# Patient Record
Sex: MALE | Race: WHITE | NOT HISPANIC OR LATINO | URBAN - METROPOLITAN AREA
[De-identification: names, ages, dates, MRNs, and addresses within clinical notes are randomized per-mention and may not be internally consistent; named-entity substitution may affect disease eponyms.]

---

## 2018-07-11 ENCOUNTER — OFFICE VISIT (OUTPATIENT)
Dept: INTERNAL MEDICINE | Facility: CLINIC | Age: 23
End: 2018-07-11

## 2018-07-11 VITALS
OXYGEN SATURATION: 100 % | BODY MASS INDEX: 39.71 KG/M2 | RESPIRATION RATE: 18 BRPM | DIASTOLIC BLOOD PRESSURE: 70 MMHG | HEIGHT: 67 IN | SYSTOLIC BLOOD PRESSURE: 120 MMHG | WEIGHT: 253 LBS

## 2018-07-11 DIAGNOSIS — K21.9 GASTROESOPHAGEAL REFLUX DISEASE, ESOPHAGITIS PRESENCE NOT SPECIFIED: Primary | ICD-10-CM

## 2018-07-11 DIAGNOSIS — M54.50 CHRONIC BILATERAL LOW BACK PAIN WITHOUT SCIATICA: ICD-10-CM

## 2018-07-11 DIAGNOSIS — G89.29 CHRONIC BILATERAL LOW BACK PAIN WITHOUT SCIATICA: ICD-10-CM

## 2018-07-11 PROCEDURE — 99203 OFFICE O/P NEW LOW 30 MIN: CPT | Performed by: FAMILY MEDICINE

## 2018-07-11 RX ORDER — OMEPRAZOLE 20 MG/1
20 TABLET, DELAYED RELEASE ORAL DAILY
Qty: 30 TABLET | Refills: 0 | Status: SHIPPED | OUTPATIENT
Start: 2018-07-11 | End: 2018-08-08
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
## 2018-07-11 NOTE — PATIENT INSTRUCTIONS
Thank you for coming in today.  I have ordered a referral for an upper scope procedure for you.  You will be called to set up an appointment with this specialist.  I have also prescribed Omeprazole (Prilosec) for you.  Your new medication has been electronically prescribed and will be at your pharmacy.  Please pick this up and take as directed.  May increase dose to 40 mg if 20 mg is not effective.  To help manage your heartburn/reflux symptoms, recommend that you avoid caffeine, alcohol, tobacco and spicy/greasy foods.  Sleep with the head of the bed slightly elevated to decrease reflux symptoms at night.  Avoid eating 3-4 hours prior to bedtime.   Please follow-up as indicated.  Return to the clinic sooner if your symptoms worsen or if you have any other concerns.

## 2018-08-08 ENCOUNTER — OFFICE VISIT (OUTPATIENT)
Dept: INTERNAL MEDICINE | Facility: CLINIC | Age: 23
End: 2018-08-08

## 2018-08-08 VITALS
WEIGHT: 255 LBS | DIASTOLIC BLOOD PRESSURE: 72 MMHG | RESPIRATION RATE: 18 BRPM | BODY MASS INDEX: 40.02 KG/M2 | HEIGHT: 67 IN | SYSTOLIC BLOOD PRESSURE: 106 MMHG

## 2018-08-08 DIAGNOSIS — K21.9 GASTROESOPHAGEAL REFLUX DISEASE, ESOPHAGITIS PRESENCE NOT SPECIFIED: Primary | ICD-10-CM

## 2018-08-08 PROCEDURE — 99213 OFFICE O/P EST LOW 20 MIN: CPT | Performed by: FAMILY MEDICINE

## 2018-08-08 RX ORDER — ESOMEPRAZOLE MAGNESIUM 40 MG/1
40 CAPSULE, DELAYED RELEASE ORAL
Qty: 30 CAPSULE | Refills: 0 | Status: SHIPPED | OUTPATIENT
Start: 2018-08-08 | End: 2019-01-11

## 2018-08-08 NOTE — PROGRESS NOTES
"Subjective   Luh Concepcion is a 23 y.o. male who presents to follow-up for Heartburn      History of Present Illness   Patient presents to follow-up for chronic heartburn management.  Was prescribed Prilosec after his last office visit and has been taking his medication as directed.  Denies any intolerable medication side effects.  States that he increased his dose to 40 mg after taking his medication for one week.  Has been having to take Tums twice a day a few days out of the week for breakthrough symptoms.  Denies associated nausea, vomiting, diarrhea, melena, hematochezia, abdominal pain.  Has been trying to monitor and avoid possible food triggers.  Admits that he was drinking alcohol recently while on vacation.    Review of Systems   Constitutional: Negative for appetite change, chills, fever and unexpected weight change.   Respiratory: Negative for cough, shortness of breath and wheezing.    Cardiovascular: Negative for chest pain and palpitations.   Gastrointestinal: Negative for abdominal pain, anal bleeding, blood in stool, constipation, diarrhea, nausea and vomiting.        Positive for heartburn.   Neurological: Negative for dizziness, syncope and headaches.     The following portions of the patient's history were reviewed and updated as appropriate: current medications, past medical history and problem list.    Objective   /72   Resp 18   Ht 170.2 cm (67\")   Wt 116 kg (255 lb)   BMI 39.94 kg/m²      Physical Exam   Constitutional: He is oriented to person, place, and time. He appears well-developed and well-nourished.   No acute distress.   HENT:   Head: Normocephalic and atraumatic.   Eyes: Conjunctivae and EOM are normal.   Neck: Normal range of motion.   Cardiovascular: Normal rate, regular rhythm, normal heart sounds and intact distal pulses.    Pulmonary/Chest: Effort normal and breath sounds normal. He has no wheezes.   Abdominal: Soft. Bowel sounds are normal. There is no tenderness. "   Musculoskeletal: Normal range of motion.   Moves all extremities.   Neurological: He is alert and oriented to person, place, and time.   Skin: Skin is warm and dry.   Psychiatric: He has a normal mood and affect. His behavior is normal.   Vitals reviewed.      Assessment/Plan   Luh was seen today for heartburn.    Diagnoses and all orders for this visit:    Gastroesophageal reflux disease, esophagitis presence not specified  -     esomeprazole (NEXIUM) 40 MG capsule; Take 1 capsule by mouth Every Morning Before Breakfast.    Will stop Prilosec and prescribe Nexium today.  Recommended that he continue to avoid possible food triggers.  Patient was referred to gastroenterology after his last office visit.  Will defer further management to his specialist.

## 2018-08-08 NOTE — PATIENT INSTRUCTIONS
Please stop taking your Prilosec and start taking Nexium.  Your new medication has been electronically prescribed and will be at your pharmacy.  Please pick this up and take as directed.  To help manage your heartburn/reflux symptoms, recommend that you avoid caffeine, alcohol, tobacco and spicy/greasy foods.  Sleep with the head of the bed slightly elevated to decrease reflux symptoms at night.  Avoid eating 3-4 hours prior to bedtime.   Please contact the GI specialist to schedule your appointment.  Please follow-up as indicated.  Return to the clinic sooner if you have any other concerns.

## 2018-08-20 ENCOUNTER — TELEPHONE (OUTPATIENT)
Dept: GASTROENTEROLOGY | Facility: CLINIC | Age: 23
End: 2018-08-20

## 2018-08-20 NOTE — TELEPHONE ENCOUNTER
Spoke with Sabi DAVIES @ [facility]    Authorization # [id number]    EGD approved from 08/20/2018-10/19/2018 if patient needs to reschedule for any reason.

## 2019-01-08 ENCOUNTER — OUTSIDE FACILITY SERVICE (OUTPATIENT)
Dept: GASTROENTEROLOGY | Facility: CLINIC | Age: 24
End: 2019-01-08

## 2019-01-08 ENCOUNTER — LAB REQUISITION (OUTPATIENT)
Dept: LAB | Facility: HOSPITAL | Age: 24
End: 2019-01-08

## 2019-01-08 DIAGNOSIS — K30 FUNCTIONAL DYSPEPSIA: ICD-10-CM

## 2019-01-08 DIAGNOSIS — K21.9 GASTRO-ESOPHAGEAL REFLUX DISEASE WITHOUT ESOPHAGITIS: ICD-10-CM

## 2019-01-08 DIAGNOSIS — R12 HEARTBURN: ICD-10-CM

## 2019-01-08 PROCEDURE — 88305 TISSUE EXAM BY PATHOLOGIST: CPT | Performed by: INTERNAL MEDICINE

## 2019-01-08 PROCEDURE — 43239 EGD BIOPSY SINGLE/MULTIPLE: CPT | Performed by: INTERNAL MEDICINE

## 2019-01-08 PROCEDURE — 88342 IMHCHEM/IMCYTCHM 1ST ANTB: CPT | Performed by: INTERNAL MEDICINE

## 2019-01-10 LAB
CYTO UR: NORMAL
LAB AP CASE REPORT: NORMAL
LAB AP CLINICAL INFORMATION: NORMAL
PATH REPORT.FINAL DX SPEC: NORMAL
PATH REPORT.GROSS SPEC: NORMAL

## 2019-01-11 ENCOUNTER — TELEPHONE (OUTPATIENT)
Dept: GASTROENTEROLOGY | Facility: CLINIC | Age: 24
End: 2019-01-11

## 2019-01-11 DIAGNOSIS — K29.70 HELICOBACTER PYLORI GASTRITIS: Primary | ICD-10-CM

## 2019-01-11 DIAGNOSIS — B96.81 HELICOBACTER PYLORI GASTRITIS: Primary | ICD-10-CM

## 2019-01-11 DIAGNOSIS — K21.9 GASTROESOPHAGEAL REFLUX DISEASE, ESOPHAGITIS PRESENCE NOT SPECIFIED: ICD-10-CM

## 2019-01-11 RX ORDER — ESOMEPRAZOLE MAGNESIUM 40 MG/1
40 CAPSULE, DELAYED RELEASE ORAL DAILY
Qty: 30 CAPSULE | Refills: 5 | Status: SHIPPED | OUTPATIENT
Start: 2019-01-11 | End: 2019-11-01

## 2019-01-11 RX ORDER — AMOXICILLIN 500 MG/1
1000 CAPSULE ORAL 2 TIMES DAILY
Qty: 56 CAPSULE | Refills: 0 | Status: SHIPPED | OUTPATIENT
Start: 2019-01-11 | End: 2019-11-04

## 2019-01-11 RX ORDER — CLARITHROMYCIN 500 MG/1
500 TABLET, COATED ORAL 2 TIMES DAILY
Qty: 28 TABLET | Refills: 0 | Status: SHIPPED | OUTPATIENT
Start: 2019-01-11 | End: 2019-11-04

## 2019-01-11 NOTE — TELEPHONE ENCOUNTER
I called and spoke with Luh on the phone this afternoon.  He did have an ulcer found on endoscopy and the pathology demonstrated H. pylori.  I will call in prescription medication to treat H. pylori that will include Nexium, amoxicillin and Biaxin.

## 2019-11-01 ENCOUNTER — OFFICE VISIT (OUTPATIENT)
Dept: INTERNAL MEDICINE | Facility: CLINIC | Age: 24
End: 2019-11-01

## 2019-11-01 VITALS
DIASTOLIC BLOOD PRESSURE: 84 MMHG | OXYGEN SATURATION: 98 % | SYSTOLIC BLOOD PRESSURE: 132 MMHG | BODY MASS INDEX: 38.61 KG/M2 | RESPIRATION RATE: 18 BRPM | HEART RATE: 90 BPM | HEIGHT: 67 IN | WEIGHT: 246 LBS

## 2019-11-01 DIAGNOSIS — Z86.19 HISTORY OF HELICOBACTER PYLORI INFECTION: ICD-10-CM

## 2019-11-01 DIAGNOSIS — Z13.0 SCREENING FOR BLOOD DISEASE: ICD-10-CM

## 2019-11-01 DIAGNOSIS — R11.0 NAUSEA: ICD-10-CM

## 2019-11-01 DIAGNOSIS — K25.7 CHRONIC GASTRIC ULCER, UNSPECIFIED WHETHER GASTRIC ULCER HEMORRHAGE OR PERFORATION PRESENT: Primary | ICD-10-CM

## 2019-11-01 DIAGNOSIS — K21.9 GASTROESOPHAGEAL REFLUX DISEASE WITHOUT ESOPHAGITIS: ICD-10-CM

## 2019-11-01 DIAGNOSIS — Z13.29 THYROID DISORDER SCREENING: ICD-10-CM

## 2019-11-01 DIAGNOSIS — Z13.21 ENCOUNTER FOR VITAMIN DEFICIENCY SCREENING: ICD-10-CM

## 2019-11-01 PROCEDURE — 83013 H PYLORI (C-13) BREATH: CPT | Performed by: NURSE PRACTITIONER

## 2019-11-01 PROCEDURE — 99214 OFFICE O/P EST MOD 30 MIN: CPT | Performed by: NURSE PRACTITIONER

## 2019-11-01 PROCEDURE — 82306 VITAMIN D 25 HYDROXY: CPT | Performed by: NURSE PRACTITIONER

## 2019-11-01 PROCEDURE — 82607 VITAMIN B-12: CPT | Performed by: NURSE PRACTITIONER

## 2019-11-01 PROCEDURE — 82746 ASSAY OF FOLIC ACID SERUM: CPT | Performed by: NURSE PRACTITIONER

## 2019-11-01 PROCEDURE — 80050 GENERAL HEALTH PANEL: CPT | Performed by: NURSE PRACTITIONER

## 2019-11-01 RX ORDER — OMEPRAZOLE 40 MG/1
40 CAPSULE, DELAYED RELEASE ORAL DAILY
Qty: 30 CAPSULE | Refills: 5 | Status: SHIPPED | OUTPATIENT
Start: 2019-11-01 | End: 2020-05-21 | Stop reason: SDUPTHER

## 2019-11-01 NOTE — PROGRESS NOTES
Subjective   Chief Complaint   Patient presents with   • Heartburn     Had endoscopy, past H. Pylori in past       Luh Concepcion is a 24 y.o. male here today for heartburn and nausea. He has history of severe GERD. In January he had EGD and was diagnosed with H pylori with gastric ulcer and put on triple therapy. He did not follow up with GI. Symptoms improved and he's done well managing heartburn with diet. Recently he's been experiencing worsening of indigestion, nausea, and some epigastric pain. Bowel movement shave been normal and he's not had any vomiting events. He is fearful the infection has returned. He has not been taking anything for his symptoms. He denies any shortness of air or chest pain.     I have reviewed the following portions of the patient's history and confirmed they are accurate: allergies, current medications, past family history, past medical history, past social history, past surgical history, problem list and ROS     I have personally completed the patient's review of systems.    Review of Systems   Constitutional: Negative for activity change, appetite change, chills, diaphoresis, fatigue, fever, unexpected weight gain and unexpected weight loss.   HENT: Negative for ear discharge, ear pain, mouth sores, nosebleeds, sinus pressure, sneezing and sore throat.    Eyes: Negative for pain, discharge and itching.   Respiratory: Negative for cough, chest tightness, shortness of breath and wheezing.    Cardiovascular: Negative for chest pain, palpitations and leg swelling.   Gastrointestinal: Positive for abdominal pain, nausea, GERD and indigestion. Negative for constipation, diarrhea and vomiting.   Endocrine: Negative for heat intolerance, polydipsia and polyphagia.   Genitourinary: Negative for dysuria, flank pain, frequency, hematuria and urgency.   Musculoskeletal: Negative for arthralgias, back pain, gait problem, joint swelling, myalgias, neck pain and neck stiffness.   Skin: Negative  "for color change, pallor and rash.   Allergic/Immunologic: Negative for immunocompromised state.   Neurological: Negative for seizures, speech difficulty, weakness and numbness.   Hematological: Negative for adenopathy.   Psychiatric/Behavioral: Negative for agitation, decreased concentration, sleep disturbance and depressed mood. The patient is not nervous/anxious.        No current outpatient medications on file prior to visit.     No current facility-administered medications on file prior to visit.        Objective   Vitals:    11/01/19 1536   BP: 132/84   Pulse: 90   Resp: 18   SpO2: 98%   Weight: 112 kg (246 lb)   Height: 170.2 cm (67.01\")     Body mass index is 38.52 kg/m².    Physical Exam   Constitutional: He is oriented to person, place, and time. He appears well-developed and well-nourished.   HENT:   Head: Normocephalic and atraumatic.   Nose: Nose normal.   Eyes: EOM are normal. Pupils are equal, round, and reactive to light.   Neck: Trachea normal and normal range of motion. No thyromegaly present.   Cardiovascular: Normal rate, regular rhythm and normal heart sounds.   Pulmonary/Chest: Effort normal and breath sounds normal.   Abdominal: Soft. Bowel sounds are normal. There is no tenderness.   Musculoskeletal: Normal range of motion.   Neurological: He is alert and oriented to person, place, and time. He has normal strength. GCS eye subscore is 4. GCS verbal subscore is 5. GCS motor subscore is 6.   Skin: Skin is warm and dry.   Psychiatric: He has a normal mood and affect. His speech is normal and behavior is normal. Thought content normal. Cognition and memory are normal.   Vitals reviewed.      Assessment/Plan   Luh was seen today for heartburn.    Diagnoses and all orders for this visit:    Chronic gastric ulcer, unspecified whether gastric ulcer hemorrhage or perforation present  Chronic issue unstable requiring medication management and monitoring. Will eat well balanced diet refraining from " spicy and acidic foods, increase water intake refraining from soda/caffeine and alcohol, increase physical activity, and get adequate rest.   -     CBC Auto Differential  -     Comprehensive Metabolic Panel  -     H. Pylori Breath Test - Breath, Lung  -     omeprazole (priLOSEC) 40 MG capsule; Take 1 capsule by mouth Daily.    Gastroesophageal reflux disease without esophagitis  Chronic issue unstable requiring medication management and monitoring. Will eat well balanced diet refraining from spicy and acidic foods, increase water intake refraining from soda/caffeine and alcohol, increase physical activity, and get adequate rest.   -     CBC Auto Differential  -     Comprehensive Metabolic Panel  -     H. Pylori Breath Test - Breath, Lung  -     omeprazole (priLOSEC) 40 MG capsule; Take 1 capsule by mouth Daily.    Nausea  New onset issue requiring medication management and monitoring. Will eat BRAT diet refraining from spicy and acidic foods, increase water intake refraining from soda/caffeine and alcohol, can drink electrolyte balanced beverages including propel pain Gatorade, and get adequate rest.  Will increase diet as tolerated.   -     CBC Auto Differential  -     Comprehensive Metabolic Panel  -     omeprazole (priLOSEC) 40 MG capsule; Take 1 capsule by mouth Daily.    History of Helicobacter pylori infection  -     H. Pylori Breath Test - Breath, Lung  -     omeprazole (priLOSEC) 40 MG capsule; Take 1 capsule by mouth Daily.    Encounter for vitamin deficiency screening  -     Vitamin B12 & Folate  -     Vitamin D 25 Hydroxy    Screening for blood disease  -     CBC Auto Differential  -     Comprehensive Metabolic Panel  -     Vitamin B12 & Folate  -     Vitamin D 25 Hydroxy  -     TSH Rfx On Abnormal To Free T4    Thyroid disorder screening  -     TSH Rfx On Abnormal To Free T4           Current Outpatient Medications:   •  omeprazole (priLOSEC) 40 MG capsule, Take 1 capsule by mouth Daily., Disp: 30  capsule, Rfl: 5  •  ondansetron ODT (ZOFRAN-ODT) 4 MG disintegrating tablet, Take 1 tablet by mouth Every 8 (Eight) Hours As Needed for Nausea or Vomiting., Disp: 20 tablet, Rfl: 0       Plan of care reviewed with the patient at the conclusion of today's visit.  Education was provided regarding diagnosis, management, and any prescribed or recommended OTC medications.  Patient verbalized understanding of and agreement with management plan.     Return if symptoms worsen or fail to improve.      Shreya Herrmann, APRN

## 2019-11-02 LAB
25(OH)D3 SERPL-MCNC: 23 NG/ML (ref 30–100)
ALBUMIN SERPL-MCNC: 4.9 G/DL (ref 3.5–5.2)
ALBUMIN/GLOB SERPL: 1.9 G/DL
ALP SERPL-CCNC: 96 U/L (ref 39–117)
ALT SERPL W P-5'-P-CCNC: 37 U/L (ref 1–41)
ANION GAP SERPL CALCULATED.3IONS-SCNC: 11 MMOL/L (ref 5–15)
AST SERPL-CCNC: 22 U/L (ref 1–40)
BASOPHILS # BLD AUTO: 0.07 10*3/MM3 (ref 0–0.2)
BASOPHILS NFR BLD AUTO: 1 % (ref 0–1.5)
BILIRUB SERPL-MCNC: 0.4 MG/DL (ref 0.2–1.2)
BUN BLD-MCNC: 13 MG/DL (ref 6–20)
BUN/CREAT SERPL: 14.3 (ref 7–25)
CALCIUM SPEC-SCNC: 9.4 MG/DL (ref 8.6–10.5)
CHLORIDE SERPL-SCNC: 101 MMOL/L (ref 98–107)
CO2 SERPL-SCNC: 27 MMOL/L (ref 22–29)
CREAT BLD-MCNC: 0.91 MG/DL (ref 0.76–1.27)
DEPRECATED RDW RBC AUTO: 38.5 FL (ref 37–54)
EOSINOPHIL # BLD AUTO: 0.08 10*3/MM3 (ref 0–0.4)
EOSINOPHIL NFR BLD AUTO: 1.1 % (ref 0.3–6.2)
ERYTHROCYTE [DISTWIDTH] IN BLOOD BY AUTOMATED COUNT: 11.8 % (ref 12.3–15.4)
FOLATE SERPL-MCNC: 12.4 NG/ML (ref 4.78–24.2)
GFR SERPL CREATININE-BSD FRML MDRD: 103 ML/MIN/1.73
GLOBULIN UR ELPH-MCNC: 2.6 GM/DL
GLUCOSE BLD-MCNC: 126 MG/DL (ref 65–99)
HCT VFR BLD AUTO: 42.1 % (ref 37.5–51)
HGB BLD-MCNC: 14.7 G/DL (ref 13–17.7)
IMM GRANULOCYTES # BLD AUTO: 0.02 10*3/MM3 (ref 0–0.05)
IMM GRANULOCYTES NFR BLD AUTO: 0.3 % (ref 0–0.5)
LYMPHOCYTES # BLD AUTO: 1.88 10*3/MM3 (ref 0.7–3.1)
LYMPHOCYTES NFR BLD AUTO: 27 % (ref 19.6–45.3)
MCH RBC QN AUTO: 30.9 PG (ref 26.6–33)
MCHC RBC AUTO-ENTMCNC: 34.9 G/DL (ref 31.5–35.7)
MCV RBC AUTO: 88.6 FL (ref 79–97)
MONOCYTES # BLD AUTO: 0.55 10*3/MM3 (ref 0.1–0.9)
MONOCYTES NFR BLD AUTO: 7.9 % (ref 5–12)
NEUTROPHILS # BLD AUTO: 4.37 10*3/MM3 (ref 1.7–7)
NEUTROPHILS NFR BLD AUTO: 62.7 % (ref 42.7–76)
NRBC BLD AUTO-RTO: 0 /100 WBC (ref 0–0.2)
PLATELET # BLD AUTO: 220 10*3/MM3 (ref 140–450)
PMV BLD AUTO: 11.7 FL (ref 6–12)
POTASSIUM BLD-SCNC: 4.3 MMOL/L (ref 3.5–5.2)
PROT SERPL-MCNC: 7.5 G/DL (ref 6–8.5)
RBC # BLD AUTO: 4.75 10*6/MM3 (ref 4.14–5.8)
SODIUM BLD-SCNC: 139 MMOL/L (ref 136–145)
TSH SERPL DL<=0.05 MIU/L-ACNC: 1.13 UIU/ML (ref 0.27–4.2)
VIT B12 BLD-MCNC: 687 PG/ML (ref 211–946)
WBC NRBC COR # BLD: 6.97 10*3/MM3 (ref 3.4–10.8)

## 2019-11-03 LAB — UREA BREATH TEST QL: NEGATIVE

## 2019-11-04 ENCOUNTER — OFFICE VISIT (OUTPATIENT)
Dept: FAMILY MEDICINE CLINIC | Facility: CLINIC | Age: 24
End: 2019-11-04

## 2019-11-04 VITALS
HEART RATE: 75 BPM | DIASTOLIC BLOOD PRESSURE: 60 MMHG | SYSTOLIC BLOOD PRESSURE: 102 MMHG | RESPIRATION RATE: 14 BRPM | WEIGHT: 246 LBS | HEIGHT: 67 IN | TEMPERATURE: 98 F | OXYGEN SATURATION: 98 % | BODY MASS INDEX: 38.61 KG/M2

## 2019-11-04 DIAGNOSIS — K52.9 GASTROENTERITIS: Primary | ICD-10-CM

## 2019-11-04 PROBLEM — E66.01 MORBIDLY OBESE (HCC): Status: ACTIVE | Noted: 2019-11-04

## 2019-11-04 PROCEDURE — 99213 OFFICE O/P EST LOW 20 MIN: CPT | Performed by: NURSE PRACTITIONER

## 2019-11-04 RX ORDER — ONDANSETRON 4 MG/1
4 TABLET, ORALLY DISINTEGRATING ORAL EVERY 8 HOURS PRN
Qty: 20 TABLET | Refills: 0 | Status: SHIPPED | OUTPATIENT
Start: 2019-11-04 | End: 2020-08-14

## 2019-11-04 NOTE — PROGRESS NOTES
"Vanessa Concepcion is a 24 y.o. male.   Chief Complaint   Patient presents with   • Vomiting      Vomiting    This is a new problem. The current episode started in the past 7 days. The problem has been unchanged. There has been no fever. Associated symptoms include headaches. Pertinent negatives include no abdominal pain, diarrhea or fever. Associated symptoms comments: Nausea vomiting.   . Risk factors include ill contacts. He has tried nothing for the symptoms. The treatment provided no relief.    \"woke up this morning with nausea and vomiting\". Felt a little nauseated after dinner last night.   Has not eaten much today. Missing college class.   Brother has similar symptoms.       The following portions of the patient's history were reviewed and updated as appropriate: allergies, current medications, past family history, past medical history, past social history, past surgical history and problem list.    Review of Systems   Constitutional: Positive for activity change, appetite change and fatigue. Negative for fever.   Respiratory: Negative.    Cardiovascular: Negative.    Gastrointestinal: Positive for nausea and vomiting. Negative for abdominal distention, abdominal pain, anal bleeding, blood in stool, constipation, diarrhea and rectal pain.   Musculoskeletal: Negative.    Skin: Negative.    Allergic/Immunologic: Negative.    Neurological: Positive for headaches.   Psychiatric/Behavioral: Negative.      Past Surgical History:   Procedure Laterality Date   • APPENDECTOMY  03/2018     Past Medical History:   Diagnosis Date   • Acid reflux        Objective   No Known Allergies  Visit Vitals  /60   Pulse 75   Temp 98 °F (36.7 °C)   Resp 14   Ht 170.2 cm (67.01\")   Wt 112 kg (246 lb)   SpO2 98%   BMI 38.52 kg/m²       Physical Exam   Constitutional: He is oriented to person, place, and time. Vital signs are normal. He appears well-developed. He is cooperative. He appears ill.   HENT:   Head: " Normocephalic.   Eyes: Pupils are equal, round, and reactive to light.   Neck: Normal range of motion.   Cardiovascular: Normal rate and regular rhythm.   Pulmonary/Chest: Effort normal and breath sounds normal.   Abdominal: Soft. Bowel sounds are normal. He exhibits no distension and no mass. There is no tenderness. There is no rebound and no guarding. No hernia.   Lymphadenopathy:     He has no cervical adenopathy.   Neurological: He is alert and oriented to person, place, and time.   Skin: Skin is warm, dry and intact. Capillary refill takes less than 2 seconds.   Psychiatric: He has a normal mood and affect. His behavior is normal.   Vitals reviewed.      Assessment/Plan   Luh was seen today for vomiting.    Diagnoses and all orders for this visit:    Gastroenteritis  -     ondansetron ODT (ZOFRAN-ODT) 4 MG disintegrating tablet; Take 1 tablet by mouth Every 8 (Eight) Hours As Needed for Nausea or Vomiting.         Follow up as needed   See gastroenteritis patient instructions   Increase fluid intake.            Patient Instructions   Viral Gastroenteritis, Adult    Viral gastroenteritis is also known as the stomach flu. This condition is caused by certain germs (viruses). These germs can be passed from person to person very easily (are very contagious). This condition can cause sudden watery poop (diarrhea), fever, and throwing up (vomiting).  Having watery poop and throwing up can make you feel weak and cause you to get dehydrated. Dehydration can make you tired and thirsty, make you have a dry mouth, and make it so you pee (urinate) less often. Older adults and people with other diseases or a weak defense system (immune system) are at higher risk for dehydration. It is important to replace the fluids that you lose from having watery poop and throwing up.  Follow these instructions at home:  Follow instructions from your doctor about how to care for yourself at home.  Eating and drinking  Follow these  instructions as told by your doctor:  · Take an oral rehydration solution (ORS). This is a drink that is sold at pharmacies and stores.  · Drink clear fluids in small amounts as you are able, such as:  ? Water.  ? Ice chips.  ? Diluted fruit juice.  ? Low-calorie sports drinks.  · Eat bland, easy-to-digest foods in small amounts as you are able, such as:  ? Bananas.  ? Applesauce.  ? Rice.  ? Low-fat (lean) meats.  ? Toast.  ? Crackers.  · Avoid fluids that have a lot of sugar or caffeine in them.  · Avoid alcohol.  · Avoid spicy or fatty foods.  General instructions    · Drink enough fluid to keep your pee (urine) clear or pale yellow.  · Wash your hands often. If you cannot use soap and water, use hand .  · Make sure that all people in your home wash their hands well and often.  · Rest at home while you get better.  · Take over-the-counter and prescription medicines only as told by your doctor.  · Watch your condition for any changes.  · Take a warm bath to help with any burning or pain from having watery poop.  · Keep all follow-up visits as told by your doctor. This is important.  Contact a doctor if:  · You cannot keep fluids down.  · Your symptoms get worse.  · You have new symptoms.  · You feel light-headed or dizzy.  · You have muscle cramps.  Get help right away if:  · You have chest pain.  · You feel very weak or you pass out (faint).  · You see blood in your throw-up.  · Your throw-up looks like coffee grounds.  · You have bloody or black poop (stools) or poop that look like tar.  · You have a very bad headache, a stiff neck, or both.  · You have a rash.  · You have very bad pain, cramping, or bloating in your belly (abdomen).  · You have trouble breathing.  · You are breathing very quickly.  · Your heart is beating very quickly.  · Your skin feels cold and clammy.  · You feel confused.  · You have pain when you pee.  · You have signs of dehydration, such as:  ? Dark pee, hardly any pee, or no  pee.  ? Cracked lips.  ? Dry mouth.  ? Sunken eyes.  ? Sleepiness.  ? Weakness.  This information is not intended to replace advice given to you by your health care provider. Make sure you discuss any questions you have with your health care provider.  Document Released: 06/05/2009 Document Revised: 09/11/2019 Document Reviewed: 08/23/2016  [employer] Interactive Patient Education © 2019 [employer]        Rama Aguirre, APRN

## 2019-11-04 NOTE — PATIENT INSTRUCTIONS
Viral Gastroenteritis, Adult    Viral gastroenteritis is also known as the stomach flu. This condition is caused by certain germs (viruses). These germs can be passed from person to person very easily (are very contagious). This condition can cause sudden watery poop (diarrhea), fever, and throwing up (vomiting).  Having watery poop and throwing up can make you feel weak and cause you to get dehydrated. Dehydration can make you tired and thirsty, make you have a dry mouth, and make it so you pee (urinate) less often. Older adults and people with other diseases or a weak defense system (immune system) are at higher risk for dehydration. It is important to replace the fluids that you lose from having watery poop and throwing up.  Follow these instructions at home:  Follow instructions from your doctor about how to care for yourself at home.  Eating and drinking  Follow these instructions as told by your doctor:  · Take an oral rehydration solution (ORS). This is a drink that is sold at pharmacies and stores.  · Drink clear fluids in small amounts as you are able, such as:  ? Water.  ? Ice chips.  ? Diluted fruit juice.  ? Low-calorie sports drinks.  · Eat bland, easy-to-digest foods in small amounts as you are able, such as:  ? Bananas.  ? Applesauce.  ? Rice.  ? Low-fat (lean) meats.  ? Toast.  ? Crackers.  · Avoid fluids that have a lot of sugar or caffeine in them.  · Avoid alcohol.  · Avoid spicy or fatty foods.  General instructions    · Drink enough fluid to keep your pee (urine) clear or pale yellow.  · Wash your hands often. If you cannot use soap and water, use hand .  · Make sure that all people in your home wash their hands well and often.  · Rest at home while you get better.  · Take over-the-counter and prescription medicines only as told by your doctor.  · Watch your condition for any changes.  · Take a warm bath to help with any burning or pain from having watery poop.  · Keep all follow-up  visits as told by your doctor. This is important.  Contact a doctor if:  · You cannot keep fluids down.  · Your symptoms get worse.  · You have new symptoms.  · You feel light-headed or dizzy.  · You have muscle cramps.  Get help right away if:  · You have chest pain.  · You feel very weak or you pass out (faint).  · You see blood in your throw-up.  · Your throw-up looks like coffee grounds.  · You have bloody or black poop (stools) or poop that look like tar.  · You have a very bad headache, a stiff neck, or both.  · You have a rash.  · You have very bad pain, cramping, or bloating in your belly (abdomen).  · You have trouble breathing.  · You are breathing very quickly.  · Your heart is beating very quickly.  · Your skin feels cold and clammy.  · You feel confused.  · You have pain when you pee.  · You have signs of dehydration, such as:  ? Dark pee, hardly any pee, or no pee.  ? Cracked lips.  ? Dry mouth.  ? Sunken eyes.  ? Sleepiness.  ? Weakness.  This information is not intended to replace advice given to you by your health care provider. Make sure you discuss any questions you have with your health care provider.  Document Released: 06/05/2009 Document Revised: 09/11/2019 Document Reviewed: 08/23/2016  [employer] Patient Education © 2019 [employer]

## 2019-11-11 RX ORDER — ERGOCALCIFEROL 1.25 MG/1
50000 CAPSULE ORAL WEEKLY
Qty: 4 CAPSULE | Refills: 2 | Status: SHIPPED | OUTPATIENT
Start: 2019-11-11 | End: 2021-11-16

## 2019-11-11 NOTE — PROGRESS NOTES
Please contact patient and explain that test results indicate that your vitamin D level is low.  This can contribute to fatigue, depression, and problems with your bones. I have called in a prescription for Vitamin D to take once a weekly. His H plyori test is negative but I want him to continue the prilosec and follow up if stomach symptoms continue. All other labs are normal.

## 2019-11-13 ENCOUNTER — OFFICE VISIT (OUTPATIENT)
Dept: INTERNAL MEDICINE | Facility: CLINIC | Age: 24
End: 2019-11-13

## 2019-11-13 VITALS
HEART RATE: 61 BPM | HEIGHT: 67 IN | BODY MASS INDEX: 39.27 KG/M2 | OXYGEN SATURATION: 98 % | DIASTOLIC BLOOD PRESSURE: 68 MMHG | RESPIRATION RATE: 18 BRPM | WEIGHT: 250.2 LBS | SYSTOLIC BLOOD PRESSURE: 114 MMHG

## 2019-11-13 DIAGNOSIS — Z13.0 SCREENING FOR BLOOD DISEASE: ICD-10-CM

## 2019-11-13 DIAGNOSIS — K25.7 CHRONIC GASTRIC ULCER, UNSPECIFIED WHETHER GASTRIC ULCER HEMORRHAGE OR PERFORATION PRESENT: ICD-10-CM

## 2019-11-13 DIAGNOSIS — Z13.1 DIABETES MELLITUS SCREENING: ICD-10-CM

## 2019-11-13 DIAGNOSIS — K21.9 GASTROESOPHAGEAL REFLUX DISEASE WITHOUT ESOPHAGITIS: Primary | ICD-10-CM

## 2019-11-13 DIAGNOSIS — Z13.220 LIPID SCREENING: ICD-10-CM

## 2019-11-13 DIAGNOSIS — R73.9 HYPERGLYCEMIA: ICD-10-CM

## 2019-11-13 DIAGNOSIS — R11.0 NAUSEA: ICD-10-CM

## 2019-11-13 LAB
CHOLEST SERPL-MCNC: 264 MG/DL (ref 0–200)
HBA1C MFR BLD: 5.62 % (ref 4.8–5.6)
HDLC SERPL-MCNC: 30 MG/DL (ref 40–60)
LDLC SERPL CALC-MCNC: ABNORMAL MG/DL
LDLC/HDLC SERPL: ABNORMAL {RATIO}
TRIGL SERPL-MCNC: 453 MG/DL (ref 0–150)
VLDLC SERPL-MCNC: ABNORMAL MG/DL

## 2019-11-13 PROCEDURE — 80061 LIPID PANEL: CPT | Performed by: NURSE PRACTITIONER

## 2019-11-13 PROCEDURE — 99214 OFFICE O/P EST MOD 30 MIN: CPT | Performed by: NURSE PRACTITIONER

## 2019-11-13 PROCEDURE — 86255 FLUORESCENT ANTIBODY SCREEN: CPT | Performed by: NURSE PRACTITIONER

## 2019-11-13 PROCEDURE — 82784 ASSAY IGA/IGD/IGG/IGM EACH: CPT | Performed by: NURSE PRACTITIONER

## 2019-11-13 PROCEDURE — 83516 IMMUNOASSAY NONANTIBODY: CPT | Performed by: NURSE PRACTITIONER

## 2019-11-13 PROCEDURE — 83036 HEMOGLOBIN GLYCOSYLATED A1C: CPT | Performed by: NURSE PRACTITIONER

## 2019-11-13 PROCEDURE — 83721 ASSAY OF BLOOD LIPOPROTEIN: CPT | Performed by: NURSE PRACTITIONER

## 2019-11-13 NOTE — PROGRESS NOTES
Subjective   Chief Complaint   Patient presents with   • Heartburn     medication follow up, making sores    • Gluten Intolerance      Luh Concepcion is a 24 y.o. male here today to follow up on GERD and food intolerances. He has been taking the Prilosec daily that greatly helps his stomach upset and nausea but he has developed sores in the corners of his mouth. This happens about 2 weeks after starting a PPI. He continues to take it because it helps his symptoms but the sores are causing some discomfort. He has cut gluten out of his diet and this has also helped. He is wanting test for celiac disease. He had EGD done early this year. Report was reviewed and no mention of being tested for celiac. Just that test was positive for H pylori. He does not want to get repeat EGD and wants blood test for celiac. Recent breath test without use of PPI was negative. He's had some hyperglycemia in the past with prior diagnosis of borderline diabetes. He denies any shortness of breath or chest pain. He needs fasting labs today.     I have reviewed the following portions of the patient's history and confirmed they are accurate: allergies, current medications, past family history, past medical history, past social history, past surgical history and problem list    I have personally completed the patient's review of systems.    Review of Systems   Constitutional: Negative for activity change, appetite change, chills, diaphoresis, fatigue, fever, unexpected weight gain and unexpected weight loss.   HENT: Negative for ear discharge, ear pain, mouth sores, nosebleeds, sinus pressure, sneezing and sore throat.    Eyes: Negative for pain, discharge and itching.   Respiratory: Negative for cough, chest tightness, shortness of breath and wheezing.    Cardiovascular: Negative for chest pain, palpitations and leg swelling.   Gastrointestinal: Positive for abdominal pain, nausea, GERD and indigestion. Negative for constipation, diarrhea and  "vomiting.   Endocrine: Negative for heat intolerance, polydipsia and polyphagia.   Genitourinary: Negative for dysuria, flank pain, frequency, hematuria and urgency.   Musculoskeletal: Negative for arthralgias, back pain, gait problem, joint swelling, myalgias, neck pain and neck stiffness.   Skin: Negative for color change, pallor and rash.        Painful skin cracking at corners of lips.    Allergic/Immunologic: Negative for immunocompromised state.   Neurological: Negative for seizures, speech difficulty, weakness and numbness.   Hematological: Negative for adenopathy.   Psychiatric/Behavioral: Negative for agitation, decreased concentration, sleep disturbance and depressed mood. The patient is not nervous/anxious.        Current Outpatient Medications on File Prior to Visit   Medication Sig   • omeprazole (priLOSEC) 40 MG capsule Take 1 capsule by mouth Daily.   • ondansetron ODT (ZOFRAN-ODT) 4 MG disintegrating tablet Take 1 tablet by mouth Every 8 (Eight) Hours As Needed for Nausea or Vomiting.   • vitamin D (ERGOCALCIFEROL) 1.25 MG (50150 UT) capsule capsule Take 1 capsule by mouth 1 (One) Time Per Week.     No current facility-administered medications on file prior to visit.        Objective   Vitals:    11/13/19 0821   BP: 114/68   Pulse: 61   Resp: 18   SpO2: 98%   Weight: 113 kg (250 lb 3.2 oz)   Height: 170.2 cm (67.01\")     Body mass index is 39.18 kg/m².    Physical Exam   Constitutional: He is oriented to person, place, and time. He appears well-developed and well-nourished.   HENT:   Head: Normocephalic and atraumatic.   Nose: Nose normal.   Eyes: EOM are normal. Pupils are equal, round, and reactive to light.   Neck: Trachea normal and normal range of motion. No thyromegaly present.   Cardiovascular: Normal rate, regular rhythm and normal heart sounds.   Pulmonary/Chest: Effort normal and breath sounds normal.   Abdominal: Soft. Bowel sounds are normal. There is no tenderness.   Musculoskeletal: " Normal range of motion.   Neurological: He is alert and oriented to person, place, and time. He has normal strength. GCS eye subscore is 4. GCS verbal subscore is 5. GCS motor subscore is 6.   Skin: Skin is warm and dry.   Dry skin at corners of mouth.    Psychiatric: He has a normal mood and affect. His speech is normal and behavior is normal. Thought content normal. Cognition and memory are normal.   Vitals reviewed.      Assessment/Plan   Luh was seen today for heartburn and gluten intolerance.    Diagnoses and all orders for this visit:    Gastroesophageal reflux disease without esophagitis  Chronic issue unstable requiring medication management and monitoring. Will eat well balanced diet refraining from spicy and acidic foods, increase water intake refraining from soda/caffeine and alcohol, increase physical activity, and get adequate rest.   -     Celiac Disease Panel    Chronic gastric ulcer, unspecified whether gastric ulcer hemorrhage or perforation present  Chronic issue unstable requiring medication management and monitoring. Will eat well balanced diet refraining from spicy and acidic foods, increase water intake refraining from soda/caffeine and alcohol, increase physical activity, and get adequate rest.   Continue Prilosec daily. Will start using Vaseline at corners of mouth to help with dryness.   -     Celiac Disease Panel    Hyperglycemia  Recurrent issue requiring further work up. Stability will be determined by labs. Will eat well balanced heart healthy low sugar and carb diet, drink adequate water, increase physical activity, and get adequate rest.   -     Hemoglobin A1c    Nausea  Chronic issue unstable requiring medication management and monitoring. Will eat well balanced diet refraining from spicy and acidic foods, increase water intake refraining from soda/caffeine and alcohol, increase physical activity, and get adequate rest.   -     Celiac Disease Panel    Diabetes mellitus screening  -      Hemoglobin A1c    Lipid screening  -     Lipid Panel  -     LDL Cholesterol, Direct; Future  -     LDL Cholesterol, Direct    Screening for blood disease  -     Lipid Panel  -     Hemoglobin A1c  -     LDL Cholesterol, Direct; Future  -     LDL Cholesterol, Direct           Current Outpatient Medications:   •  omeprazole (priLOSEC) 40 MG capsule, Take 1 capsule by mouth Daily., Disp: 30 capsule, Rfl: 5  •  ondansetron ODT (ZOFRAN-ODT) 4 MG disintegrating tablet, Take 1 tablet by mouth Every 8 (Eight) Hours As Needed for Nausea or Vomiting., Disp: 20 tablet, Rfl: 0  •  vitamin D (ERGOCALCIFEROL) 1.25 MG (18155 UT) capsule capsule, Take 1 capsule by mouth 1 (One) Time Per Week., Disp: 4 capsule, Rfl: 2       Plan of care reviewed with the patient at the conclusion of today's visit.  Education was provided regarding diagnosis, management, and any prescribed or recommended OTC medications.  Patient verbalized understanding of and agreement with management plan.     Return if symptoms worsen or fail to improve.      Shreya Herrmann, ARUN    Please note that portions of this note were completed with a voice recognition program. Efforts were made to edit the dictations, but occasionally words are mistranscribed.

## 2019-11-14 LAB — ARTICHOKE IGE QN: 169 MG/DL (ref 0–100)

## 2019-11-15 LAB
ENDOMYSIUM IGA SER QL: NEGATIVE
IGA SERPL-MCNC: 155 MG/DL (ref 90–386)
TTG IGA SER-ACNC: <2 U/ML (ref 0–3)

## 2019-11-25 NOTE — PROGRESS NOTES
Please contact patient and advise that his celiac disease panel is negative and normal. His cholesterol was elevated and triglycerides are very elevated. Please ask if he was fasting without anything to eat for about 7 hours and only water to drink. I don't want to treat him with meds if he wasn't fasting. Let me know because this may need to be repeated before starting meds.

## 2019-11-26 DIAGNOSIS — E78.1 HYPERTRIGLYCERIDEMIA: Primary | ICD-10-CM

## 2019-11-26 RX ORDER — FENOFIBRATE 145 MG/1
145 TABLET, COATED ORAL NIGHTLY
Qty: 30 TABLET | Refills: 2 | Status: SHIPPED | OUTPATIENT
Start: 2019-11-26 | End: 2021-11-16

## 2020-05-21 DIAGNOSIS — K21.9 GASTROESOPHAGEAL REFLUX DISEASE WITHOUT ESOPHAGITIS: ICD-10-CM

## 2020-05-21 DIAGNOSIS — R11.0 NAUSEA: ICD-10-CM

## 2020-05-21 DIAGNOSIS — Z86.19 HISTORY OF HELICOBACTER PYLORI INFECTION: ICD-10-CM

## 2020-05-21 DIAGNOSIS — K25.7 CHRONIC GASTRIC ULCER, UNSPECIFIED WHETHER GASTRIC ULCER HEMORRHAGE OR PERFORATION PRESENT: ICD-10-CM

## 2020-05-21 RX ORDER — OMEPRAZOLE 40 MG/1
40 CAPSULE, DELAYED RELEASE ORAL DAILY
Qty: 30 CAPSULE | Refills: 5 | Status: SHIPPED | OUTPATIENT
Start: 2020-05-21 | End: 2020-12-28

## 2020-05-21 NOTE — TELEPHONE ENCOUNTER
REFILL REQUEST:    omeprazole (priLOSEC) 40 MG capsule 30 capsule 5      Sig - Route: Take 1 capsule by mouth Daily. - Oral      [facility] [place]

## 2020-08-14 ENCOUNTER — OFFICE VISIT (OUTPATIENT)
Dept: INTERNAL MEDICINE | Facility: CLINIC | Age: 25
End: 2020-08-14

## 2020-08-14 VITALS
HEIGHT: 67 IN | BODY MASS INDEX: 35.47 KG/M2 | DIASTOLIC BLOOD PRESSURE: 72 MMHG | TEMPERATURE: 98.2 F | RESPIRATION RATE: 16 BRPM | WEIGHT: 226 LBS | HEART RATE: 76 BPM | OXYGEN SATURATION: 98 % | SYSTOLIC BLOOD PRESSURE: 134 MMHG

## 2020-08-14 DIAGNOSIS — R73.03 BORDERLINE DIABETES: ICD-10-CM

## 2020-08-14 DIAGNOSIS — E55.9 VITAMIN D DEFICIENCY: ICD-10-CM

## 2020-08-14 DIAGNOSIS — E78.2 MIXED HYPERLIPIDEMIA: ICD-10-CM

## 2020-08-14 DIAGNOSIS — F41.8 DEPRESSION WITH ANXIETY: Primary | ICD-10-CM

## 2020-08-14 PROCEDURE — 99214 OFFICE O/P EST MOD 30 MIN: CPT | Performed by: NURSE PRACTITIONER

## 2020-08-14 RX ORDER — ESCITALOPRAM OXALATE 10 MG/1
10 TABLET ORAL DAILY
Qty: 30 TABLET | Refills: 1 | Status: SHIPPED | OUTPATIENT
Start: 2020-08-14 | End: 2021-11-16

## 2020-08-14 NOTE — PROGRESS NOTES
Subjective   Chief Complaint   Patient presents with   • Hyperlipidemia     f/u      Luh Concepcion is a 25 y.o. male here today for hyperlipidemia, borderline diabetes, depression, anxiety, and vitamin D deficiency.  He has been following a low-cholesterol low-fat diet.  He has been eating a low-carb and sugar diet.  He has increased his physical activity.  He is experiencing some depression and anxiety.  He has been experiencing this for months but this has been hard for him to admit.  He feels that this is become difficult to manage and would like to start some medication.  No thoughts of self-harm or harming others.  No shortness of breath or chest pain.    I have reviewed the following portions of the patient's history and confirmed they are accurate: allergies, current medications, past family history, past medical history, past social history, past surgical history and problem list    I have personally completed the patient's review of systems.    Review of Systems   Constitutional: Negative for activity change, appetite change, chills, diaphoresis, fatigue, fever, unexpected weight gain and unexpected weight loss.   HENT: Negative for ear discharge, ear pain, mouth sores, nosebleeds, sinus pressure, sneezing and sore throat.    Eyes: Negative for pain, discharge and itching.   Respiratory: Negative for cough, chest tightness, shortness of breath and wheezing.    Cardiovascular: Negative for chest pain, palpitations and leg swelling.   Gastrointestinal: Negative for abdominal pain, constipation, diarrhea, nausea and vomiting.   Endocrine: Negative for heat intolerance, polydipsia and polyphagia.   Genitourinary: Negative for dysuria, flank pain, frequency, hematuria and urgency.   Musculoskeletal: Negative for arthralgias, back pain, gait problem, joint swelling, myalgias, neck pain and neck stiffness.   Skin: Negative for color change, pallor and rash.   Allergic/Immunologic: Negative for immunocompromised  "state.   Neurological: Negative for seizures, speech difficulty, weakness and numbness.   Hematological: Negative for adenopathy.   Psychiatric/Behavioral: Positive for depressed mood. Negative for agitation, decreased concentration, sleep disturbance and suicidal ideas. The patient is nervous/anxious.        Current Outpatient Medications on File Prior to Visit   Medication Sig   • omeprazole (priLOSEC) 40 MG capsule Take 1 capsule by mouth Daily.   • fenofibrate (TRICOR) 145 MG tablet Take 1 tablet by mouth Every Night.   • vitamin D (ERGOCALCIFEROL) 1.25 MG (68550 UT) capsule capsule Take 1 capsule by mouth 1 (One) Time Per Week.     No current facility-administered medications on file prior to visit.        Objective   Vitals:    08/14/20 1358   BP: 134/72   Pulse: 76   Resp: 16   Temp: 98.2 °F (36.8 °C)   SpO2: 98%   Weight: 103 kg (226 lb)   Height: 170.2 cm (67.01\")   PainSc: 0-No pain     Body mass index is 35.39 kg/m².    Physical Exam   Constitutional: He is oriented to person, place, and time. He appears well-developed and well-nourished.   HENT:   Head: Normocephalic and atraumatic.   Nose: Nose normal.   Eyes: Pupils are equal, round, and reactive to light. Conjunctivae and lids are normal.   Neck: Trachea normal. No thyromegaly present.   Pulmonary/Chest: Effort normal. No respiratory distress.   Neurological: He is alert and oriented to person, place, and time. He has normal strength. GCS eye subscore is 4. GCS verbal subscore is 5. GCS motor subscore is 6.   Skin: Skin is warm and dry.   Psychiatric: His speech is normal and behavior is normal. His mood appears anxious. He exhibits a depressed mood.   Vitals reviewed.      Assessment/Plan   Problem List Items Addressed This Visit        Cardiovascular and Mediastinum    Mixed hyperlipidemia    Overview     Chronic unstable requiring medication management, lifestyle changes, and monitoring. Discussed how this being unstable increases risk of " cardiovascular disease and adverse events. Will follow a heart healthy diet low in cholesterol and fat. Discussed increasing fiber intake. Suggested fish oil or omega 3 supplement of 1200mg twice daily. Educated on how these help lower triglycerides and LDL. Will drink adequate water and increase physical activity as tolerated. Discussed importance of medication compliance.            Relevant Orders    CBC (No Diff)    Comprehensive Metabolic Panel    Lipid Panel       Digestive    Vitamin D deficiency    Overview     Chronic issue requiring diet changes, monitoring, and dietary supplement. Will increase dietary intake of Vitamin D through dairy milk, plant/nut based milk, and fortified foods such as juice and cereal. Will start dietary supplement as directed.            Relevant Orders    Vitamin D 25 Hydroxy       Endocrine    Borderline diabetes    Overview     Chronic issue stable requiring medication management and monitoring. Will eat well balanced heart healthy diabetic diet, drink adequate water, increase physical activity, and get adequate rest.          Relevant Orders    CBC (No Diff)    Comprehensive Metabolic Panel    Hemoglobin A1c       Other    Depression with anxiety - Primary    Overview     Chronic issue unstable requiring medication management and monitoring. Will eat well balanced diet, increase water intake, increase physical activity during the day, and get adequate rest. Discussed relaxation and coping skills and exercises.            Relevant Medications    escitalopram (LEXAPRO) 10 MG tablet             Current Outpatient Medications:   •  omeprazole (priLOSEC) 40 MG capsule, Take 1 capsule by mouth Daily., Disp: 30 capsule, Rfl: 5  •  escitalopram (LEXAPRO) 10 MG tablet, Take 1 tablet by mouth Daily., Disp: 30 tablet, Rfl: 1  •  fenofibrate (TRICOR) 145 MG tablet, Take 1 tablet by mouth Every Night., Disp: 30 tablet, Rfl: 2  •  vitamin D (ERGOCALCIFEROL) 1.25 MG (96922 UT) capsule  capsule, Take 1 capsule by mouth 1 (One) Time Per Week., Disp: 4 capsule, Rfl: 2       Plan of care reviewed with the patient at the conclusion of today's visit.  Education was provided regarding diagnosis, management, and any prescribed or recommended OTC medications.  Patient verbalized understanding of and agreement with management plan.     Return in about 1 month (around 9/14/2020), or if symptoms worsen or fail to improve.      Shreya Herrmann, APRMILO    Please note that portions of this note were completed with a voice recognition program. Efforts were made to edit the dictations, but occasionally words are mistranscribed.

## 2020-08-31 PROBLEM — E55.9 VITAMIN D DEFICIENCY: Status: ACTIVE | Noted: 2020-08-31

## 2020-08-31 PROBLEM — F41.8 DEPRESSION WITH ANXIETY: Status: ACTIVE | Noted: 2020-08-31

## 2020-08-31 PROBLEM — R73.03 BORDERLINE DIABETES: Status: ACTIVE | Noted: 2020-08-31

## 2020-08-31 PROBLEM — E78.2 MIXED HYPERLIPIDEMIA: Status: ACTIVE | Noted: 2020-08-31

## 2020-12-28 DIAGNOSIS — K21.9 GASTROESOPHAGEAL REFLUX DISEASE WITHOUT ESOPHAGITIS: ICD-10-CM

## 2020-12-28 DIAGNOSIS — Z86.19 HISTORY OF HELICOBACTER PYLORI INFECTION: ICD-10-CM

## 2020-12-28 DIAGNOSIS — K25.7 CHRONIC GASTRIC ULCER, UNSPECIFIED WHETHER GASTRIC ULCER HEMORRHAGE OR PERFORATION PRESENT: ICD-10-CM

## 2020-12-28 DIAGNOSIS — R11.0 NAUSEA: ICD-10-CM

## 2020-12-28 RX ORDER — OMEPRAZOLE 40 MG/1
CAPSULE, DELAYED RELEASE ORAL
Qty: 30 CAPSULE | Refills: 4 | Status: SHIPPED | OUTPATIENT
Start: 2020-12-28 | End: 2021-01-04

## 2021-01-02 DIAGNOSIS — R11.0 NAUSEA: ICD-10-CM

## 2021-01-02 DIAGNOSIS — Z86.19 HISTORY OF HELICOBACTER PYLORI INFECTION: ICD-10-CM

## 2021-01-02 DIAGNOSIS — K21.9 GASTROESOPHAGEAL REFLUX DISEASE WITHOUT ESOPHAGITIS: ICD-10-CM

## 2021-01-02 DIAGNOSIS — K25.7 CHRONIC GASTRIC ULCER, UNSPECIFIED WHETHER GASTRIC ULCER HEMORRHAGE OR PERFORATION PRESENT: ICD-10-CM

## 2021-01-04 RX ORDER — OMEPRAZOLE 40 MG/1
CAPSULE, DELAYED RELEASE ORAL
Qty: 30 CAPSULE | Refills: 11 | Status: SHIPPED | OUTPATIENT
Start: 2021-01-04 | End: 2021-10-26 | Stop reason: SDUPTHER

## 2021-04-08 DIAGNOSIS — K25.7 CHRONIC GASTRIC ULCER, UNSPECIFIED WHETHER GASTRIC ULCER HEMORRHAGE OR PERFORATION PRESENT: ICD-10-CM

## 2021-04-08 DIAGNOSIS — K21.9 GASTROESOPHAGEAL REFLUX DISEASE WITHOUT ESOPHAGITIS: ICD-10-CM

## 2021-04-08 DIAGNOSIS — Z86.19 HISTORY OF HELICOBACTER PYLORI INFECTION: ICD-10-CM

## 2021-04-08 DIAGNOSIS — R11.0 NAUSEA: ICD-10-CM

## 2021-04-08 RX ORDER — OMEPRAZOLE 40 MG/1
40 CAPSULE, DELAYED RELEASE ORAL DAILY
Qty: 30 CAPSULE | Refills: 11 | Status: CANCELLED | OUTPATIENT
Start: 2021-04-08

## 2021-04-08 NOTE — TELEPHONE ENCOUNTER
Caller: Luh Concepcion    Relationship: Self    Best call back number:[phone]     Medication needed:   Requested Prescriptions     Pending Prescriptions Disp Refills   • omeprazole (priLOSEC) 40 MG capsule 30 capsule 11     Sig: Take 1 capsule by mouth Daily.       When do you need the refill by: TODAY    What additional details did the patient provide when requesting the medication: PATIENT HAS BEEN OUT FOR A COUPLE OF MONTHS AND NEEDS TO KNOW IF HE NEEDS AN APPOINTMENT OR CAN IT BE REFILLED PLEASE ADVISE    Does the patient have less than a 3 day supply:  [x] Yes  [] No    What is the patient's preferred pharmacy: [facility] [place] [address] AT [place] [address] - [phone] [facility] [phone] FX

## 2021-10-26 ENCOUNTER — OFFICE VISIT (OUTPATIENT)
Dept: INTERNAL MEDICINE | Facility: CLINIC | Age: 26
End: 2021-10-26

## 2021-10-26 VITALS
TEMPERATURE: 97.3 F | HEIGHT: 69 IN | BODY MASS INDEX: 35.4 KG/M2 | SYSTOLIC BLOOD PRESSURE: 132 MMHG | HEART RATE: 77 BPM | DIASTOLIC BLOOD PRESSURE: 80 MMHG | OXYGEN SATURATION: 98 % | RESPIRATION RATE: 16 BRPM | WEIGHT: 239 LBS

## 2021-10-26 DIAGNOSIS — Z13.21 ENCOUNTER FOR VITAMIN DEFICIENCY SCREENING: ICD-10-CM

## 2021-10-26 DIAGNOSIS — E78.2 MIXED HYPERLIPIDEMIA: ICD-10-CM

## 2021-10-26 DIAGNOSIS — F41.8 DEPRESSION WITH ANXIETY: ICD-10-CM

## 2021-10-26 DIAGNOSIS — K21.9 GASTROESOPHAGEAL REFLUX DISEASE WITHOUT ESOPHAGITIS: Primary | ICD-10-CM

## 2021-10-26 DIAGNOSIS — R06.83 SNORING: ICD-10-CM

## 2021-10-26 DIAGNOSIS — Z13.29 THYROID DISORDER SCREENING: ICD-10-CM

## 2021-10-26 DIAGNOSIS — E55.9 VITAMIN D DEFICIENCY: ICD-10-CM

## 2021-10-26 DIAGNOSIS — R41.840 DIFFICULTY CONCENTRATING: ICD-10-CM

## 2021-10-26 DIAGNOSIS — Z13.0 SCREENING FOR BLOOD DISEASE: ICD-10-CM

## 2021-10-26 DIAGNOSIS — R73.03 BORDERLINE DIABETES: ICD-10-CM

## 2021-10-26 PROCEDURE — 99214 OFFICE O/P EST MOD 30 MIN: CPT | Performed by: NURSE PRACTITIONER

## 2021-10-26 RX ORDER — OMEPRAZOLE 40 MG/1
40 CAPSULE, DELAYED RELEASE ORAL DAILY
Qty: 30 CAPSULE | Refills: 11 | Status: SHIPPED | OUTPATIENT
Start: 2021-10-26

## 2021-11-16 ENCOUNTER — TELEMEDICINE (OUTPATIENT)
Dept: PSYCHIATRY | Facility: CLINIC | Age: 26
End: 2021-11-16

## 2021-11-16 DIAGNOSIS — F90.2 ATTENTION DEFICIT HYPERACTIVITY DISORDER, COMBINED TYPE: Primary | ICD-10-CM

## 2021-11-16 DIAGNOSIS — F33.0 MILD EPISODE OF RECURRENT MAJOR DEPRESSIVE DISORDER (HCC): ICD-10-CM

## 2021-11-16 PROCEDURE — 90792 PSYCH DIAG EVAL W/MED SRVCS: CPT

## 2021-11-16 RX ORDER — ATOMOXETINE 40 MG/1
40 CAPSULE ORAL DAILY
Qty: 30 CAPSULE | Refills: 0 | Status: SHIPPED | OUTPATIENT
Start: 2021-11-16 | End: 2021-12-14

## 2021-11-16 NOTE — PROGRESS NOTES
This provider is located at [place], [place]. The Patient is seen remotely using Video. Patient is being seen via telehealth and confirm that they are in a secure environment for this session. Patient is located in [place], [place]. The patient's condition being diagnosed/treated is appropriate for telemedicine. Provider identified as Ramana Alvarez as well as credentials APRN MSN PMHNP-BC.   The client/patient gave consent to be seen remotely, and when consent is given they understand that the consent allows for patient identifiable information to be sent to a third party as needed.  They may refuse to be seen remotely at any time. The electronic data is encrypted and password protected, and the patient has been advised of the potential risks to privacy not withstanding such measures.    Subjective     Luh Concepcion is a 26 y.o. male who presents today for initial evaluation     Chief Complaint: ADHD, depression    History of Present Illness: This is the first encounter for this APRN with the patient.  Patient is referral from his primary care physician for evaluation of ADHD.  Patient reports that he feels that he has ADHD.  He states that several of his friends have noticed these type of symptoms in him.  Patient states that he has a hard time remembering things.  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  He states during this example that his roommate found the oven on 3 hours later.  He states has also caused him to be late for work several times, and is threatening his job.  He states there are times when he will ask somebody a question and not even pay attention to what the answer and have to ask them again.  He states his mind wanders a lot.  Patient makes careless mistakes.  Patient even made mistakes on the PHQ-9 questionnaire on this visit, and this APRN  "had to go revisit and redo it with him.  Patient states he has trouble completing tasks, and will often start another task before finishing the first 1.  He states he has hyperfocus when he does things such as video games.  He states he is easily distracted.  He states he brings up random topics in conversations because his mind has been wandering.  He states he loses things a lot, such as his keys while at, and tools.  He said he is always disorganized in his desk is messy.  He states his grades suffered in school, but he was never diagnosed.  He states he remembers getting in trouble for blurting out things in grade school.  He states he was hyper as a kid.  He states his father's nickname for him was \"motor\".  Patient states if he drinks 3 cups of coffee in the morning that his focus and concentration are better during that day.  Patient states that he tried Lexapro last year.  States he had a break-up and found himself isolating and playing video games in his dad's basement most of the time.  He does not feel like depression is an issue for him at this time.  Rates his depression a 2-3 on a 1-10 scale with 10 being the worst.  Denies any suicidal or homicidal ideation.  Sleep and appetite are good.  Patient states he has some anxiety, but again does not think it is a treatment issue.  He rates his anxiety a 4 on a 1-10 scale with 10 being the worst.  He said there is been a few times in his life where he is felt anxious in social situations.  He states that is not typical though and he is able to get through those without incident.  He states he does have worry but it is not excessive about bills and finances at times.  He states he is able to \"turn it off and redirect his thoughts\" when this happens.  Patient denies any history of any manic type symptoms.  Denies any auditory visual sensations.  Denies any paranoia.    The following portions of the patient's history were reviewed and updated as appropriate: " allergies, current medications, past family history, past medical history, past social history, past surgical history and problem list.    Past Psychiatric History: Patient was tried on Lexapro from his PCP last year.  He states he only took it a month.  Denies any other treatment history.  Denies any history of suicide attempts.  Denies any inpatient hospitalizations.    Family Psychiatric History: Paternal uncles, and aunts had trouble with alcohol and drugs.  Patient's mother was an alcoholic, but has been sober for the last 4 to 5 years.  No suicides among first-degree relatives.    Substance Use History: Patient states he drinks alcohol once a week on the weekends.  States during these times he often drinks to being drunk.  Denies any other drug use.    Past Medical History:  Past Medical History:   Diagnosis Date   • Acid reflux        Social History: Patient was born in [place], [place].  He was raised by his mother and father.  His parents  at age 14.  He has 1 brother.  Denies any history of abuse.  He has an associates degree in surveying.  Has been working as a  for the last 4 years.  States he is currently in college working on his bachelor's degree.  Patient is single with no children.  Denies any legal issues.  Hobbies include working on cars, boating [place] the lake, and video games.  Social History     Socioeconomic History   • Marital status: Single   Tobacco Use   • Smoking status: Never Smoker   • Smokeless tobacco: Current User     Types: Snuff   Substance and Sexual Activity   • Alcohol use: Yes     Comment: 5- 10 drinks per week   • Drug use: No   • Sexual activity: Yes     Partners: Female       Family History:  Family History   Problem Relation Age of Onset   • No Known Problems Mother    • Hypertension Father    • Heart attack Maternal Grandfather    • Diabetes Paternal Grandmother    • Obesity Paternal Grandmother        Past Surgical History:  Past Surgical  History:   Procedure Laterality Date   • APPENDECTOMY  03/2018       Problem List:  Patient Active Problem List   Diagnosis   • Gastroesophageal reflux disease   • Chronic bilateral low back pain without sciatica   • Morbidly obese (HCC)   • Depression with anxiety   • Mixed hyperlipidemia   • Borderline diabetes   • Vitamin D deficiency   • Attention deficit hyperactivity disorder, combined type   • Mild episode of recurrent major depressive disorder (HCC)       Allergy:   No Known Allergies     Current Medications:   Current Outpatient Medications   Medication Sig Dispense Refill   • atomoxetine (Strattera) 40 MG capsule Take 1 capsule by mouth Daily. 30 capsule 0   • omeprazole (priLOSEC) 40 MG capsule Take 1 capsule by mouth Daily. 30 capsule 11     No current facility-administered medications for this visit.       Review of Symptoms:    Review of Systems   Constitutional: Negative.    HENT: Negative.    Eyes: Negative.    Respiratory: Negative.    Cardiovascular: Negative.    Gastrointestinal: Positive for GERD.   Endocrine: Negative.    Genitourinary: Negative.    Musculoskeletal: Positive for back pain.   Skin: Negative.    Allergic/Immunologic: Negative.    Neurological: Negative.    Hematological: Negative.    Psychiatric/Behavioral: Positive for decreased concentration.         Physical Exam:   There were no vitals taken for this visit.    Appearance: Normal  Gait, Station, Strength: Within normal limits    Mental Status Exam:   Hygiene:   good  Cooperation:  Cooperative  Eye Contact:  Good  Psychomotor Behavior:  Appropriate  Affect:  Full range  Mood: normal  Hopelessness: Denies  Speech:  Normal  Thought Process:  Goal directed  Thought Content:  Normal  Suicidal:  None  Homicidal:  None  Hallucinations:  None  Delusion:  None  Memory:  Intact  Orientation:  Person, Place, Time and Situation  Reliability:  good  Insight:  Good  Judgement:  Good  Impulse Control:  Good    PHQ-9 Depression  Screening  Little interest or pleasure in doing things? 1   Feeling down, depressed, or hopeless? (P) 1   Trouble falling or staying asleep, or sleeping too much? (P) 3   Feeling tired or having little energy? 2   Poor appetite or overeating? (P) 2   Feeling bad about yourself - or that you are a failure or have let yourself or your family down? (P) 1   Trouble concentrating on things, such as reading the newspaper or watching television? (P) 3   Moving or speaking so slowly that other people could have noticed? Or the opposite - being so fidgety or restless that you have been moving around a lot more than usual? 1   Thoughts that you would be better off dead, or of hurting yourself in some way? 0   PHQ-9 Total Score (P) 14   If you checked off any problems, how difficult have these problems made it for you to do your work, take care of things at home, or get along with other people? Somewhat difficult     PHQ-9 Total Score: (P) 14    BHAVIN 7 anxiety screening tool that patient filled out virtually reviewed by this APRN at today's encounter.    PROMIS scale screening tool that patient filled out virtually reviewed by this APRN at today's encounter.    [facility] request number [id number] reviewed by this APRN at today's encounter.    Previous Provider notes and available records reviewed by this APRN today.     Lab Results:   No visits with results within 6 Month(s) from this visit.   Latest known visit with results is:   Office Visit on 11/13/2019   Component Date Value Ref Range Status   • Endomysial IgA 11/13/2019 Negative  Negative Final   • Tissue Transglutaminase IgA 11/13/2019 <2  0 - 3 U/mL Final                                  Negative        0 -  3                                Weak Positive   4 - 10                                Positive           >10   Tissue Transglutaminase (tTG) has been identified   as the endomysial antigen.  Studies have demonstr-   ated that endomysial IgA antibodies have over 99%    specificity for gluten sensitive enteropathy.   • IgA 11/13/2019 155  90 - 386 mg/dL Final   • Total Cholesterol 11/13/2019 264* 0 - 200 mg/dL Final   • Triglycerides 11/13/2019 453* 0 - 150 mg/dL Final   • HDL Cholesterol 11/13/2019 30* 40 - 60 mg/dL Final   • LDL Cholesterol  11/13/2019    Final    Unable to calculate   • VLDL Cholesterol 11/13/2019    Final    Unable to calculate   • LDL/HDL Ratio 11/13/2019    Final    Unable to calculate   • Hemoglobin A1C 11/13/2019 5.62* 4.80 - 5.60 % Final   • LDL Cholesterol  11/13/2019 169* 0 - 100 mg/dL Final       Assessment/Plan   Problems Addressed this Visit        Mental Health    Attention deficit hyperactivity disorder, combined type - Primary    Relevant Medications    atomoxetine (Strattera) 40 MG capsule    Mild episode of recurrent major depressive disorder (HCC)    Relevant Medications    atomoxetine (Strattera) 40 MG capsule      Diagnoses       Codes Comments    Attention deficit hyperactivity disorder, combined type    -  Primary ICD-10-CM: F90.2  ICD-9-CM: 314.01     Mild episode of recurrent major depressive disorder (HCC)     ICD-10-CM: F33.0  ICD-9-CM: 296.31           Visit Diagnoses:    ICD-10-CM ICD-9-CM   1. Attention deficit hyperactivity disorder, combined type  F90.2 314.01   2. Mild episode of recurrent major depressive disorder (HCC)  F33.0 296.31     Discussed treatment options with the patient.  Gave patient the option of starting Wellbutrin or Strattera.  Discussed both medications, and informed him that Wellbutrin could also treat depression.  Again patient states that he does not feel his depression is an issue at this time.  Patient states he would like to start Strattera.  Start Strattera 40 mg daily for ADHD.  Informed patient that it could raise his pulse and blood pressure a few points, so he should monitor that.  Patient agreed and verbalized understanding.  Cautioned patient about drinking alcohol with this medication.  Patient  agreed and verbalized understanding.  Offered therapy services for the patient.  Patient states that may be something he looks at doing, but would like to think about it first, and also see what the medication does for him.    TREATMENT PLAN/GOALS: Continue supportive psychotherapy efforts and medications as indicated. Treatment and medication options discussed during today's visit. Patient acknowledged and verbally consented to continue with current treatment plan and was educated on the importance of compliance with treatment and follow-up appointments.    Short Term Goals: Patient will be compliant with medication, and patient will have no significant medication related side effects.  Patient will be engaged in psychotherapy as indicated.  Patient will report subjective improvement of symptoms.    Long term goals: To stabilize mood and treat/improve subjective symptoms, the patient will stay out of the hospital, the patient will be at an optimal level of functioning, and the patient will take all medications as prescribed.  The patient verbalized understanding and agreement with goals that were mutually set.    MEDICATION ISSUES:    Discussed medication options and treatment plan of prescribed medication as well as the risks, benefits, and side effects including potential falls, possible impaired driving and metabolic adversities among others. Patient is agreeable to call the office with any worsening of symptoms or onset of side effects. Patient is agreeable to call 911 or go to the nearest ER should he/she begin having SI/HI.     MEDS ORDERED DURING VISIT:  New Medications Ordered This Visit   Medications   • atomoxetine (Strattera) 40 MG capsule     Sig: Take 1 capsule by mouth Daily.     Dispense:  30 capsule     Refill:  0       Return in about 4 weeks (around 12/14/2021) for Video visit.             This document has been electronically signed by ARUN Dejesus  November 16, 2021 09:34 EST    Part of  this note may be an electronic transmission of spoken language to printed text using the Dragon Dictation System.

## 2021-12-14 ENCOUNTER — TELEMEDICINE (OUTPATIENT)
Dept: PSYCHIATRY | Facility: CLINIC | Age: 26
End: 2021-12-14

## 2021-12-14 DIAGNOSIS — F90.2 ATTENTION DEFICIT HYPERACTIVITY DISORDER, COMBINED TYPE: Primary | ICD-10-CM

## 2021-12-14 PROCEDURE — 99213 OFFICE O/P EST LOW 20 MIN: CPT

## 2021-12-14 NOTE — PROGRESS NOTES
"This provider is located at [place], [place]. The Patient is seen remotely using Video. Patient is being seen via telehealth and confirm that they are in a secure environment for this session. Patient is located in [place], [place]. The patient's condition being diagnosed/treated is appropriate for telemedicine. Provider identified as Ramana Alvarez as well as credentials ARUN MSN PMHNP-BC.   The client/patient gave consent to be seen remotely, and when consent is given they understand that the consent allows for patient identifiable information to be sent to a third party as needed.  They may refuse to be seen remotely at any time. The electronic data is encrypted and password protected, and the patient has been advised of the potential risks to privacy not withstanding such measures.    Chief Complaint  ADHD    Subjective        Luh Concepcion presents to [facility] for   History of Present Illness  Patient seen today for a follow-up visit for ADHD.  At last visit we started Strattera 40 mg daily for ADHD.  Patient reports that he continually had nausea on this medication.  States he continued to take it, but did not seem any benefit from it.  He states that he felt \"blah\" on the medication.  He states he did not notice any improvement in the area of remembering things, losing things, being forgetful, being late for work, paying attention in conversations, mind wandering, and making careless mistakes.  He states Strattera also made his sleep worse.  States he would wake up intermittently a lot.  Patient does not want to try to increase the medication as he feels it is not working and also due to the nausea.  Discussed with patient the option of starting Wellbutrin to see if it will help with his ADHD type symptoms.  Patient states that several of his friends have had good luck with Adderall and Vyvanse and he feels that he would like to go that route at this time.  Informed " him that this APRN could not write that medication for him, and that he will have to be referred to another clinician in our practice.  Patient states he would like to do so.  We will arrange for patient to see another APRN to be evaluated for a stimulant.  Patient denies any current symptoms of depression or anxiety.  Denies any auditory or visual hallucinations.  Denies any suicidal or homicidal ideation.  Denies any paranoia.  Objective   Vital Signs:   There were no vitals taken for this visit.      PHQ-9 Score:   PHQ-9 Total Score: (P) 7     Mental Status Exam:   Hygiene:   good  Cooperation:  Cooperative  Eye Contact:  Good  Psychomotor Behavior:  Appropriate  Affect:  Full range  Mood: normal  Speech:  Normal  Thought Process:  Goal directed  Thought Content:  Normal  Suicidal:  None  Homicidal:  None  Hallucinations:  None  Delusion:  None  Memory:  Intact  Orientation:  Person, Place, Time and Situation  Reliability:  good  Insight:  Good  Judgement:  Good  Impulse Control:  Good  Physical/Medical Issues:  No      PHQ-9 Depression Screening  Little interest or pleasure in doing things? (P) 2   Feeling down, depressed, or hopeless? (P) 0   Trouble falling or staying asleep, or sleeping too much? (P) 1   Feeling tired or having little energy? (P) 1   Poor appetite or overeating? (P) 1   Feeling bad about yourself - or that you are a failure or have let yourself or your family down? (P) 0   Trouble concentrating on things, such as reading the newspaper or watching television? (P) 2   Moving or speaking so slowly that other people could have noticed? Or the opposite - being so fidgety or restless that you have been moving around a lot more than usual? (P) 0   Thoughts that you would be better off dead, or of hurting yourself in some way? (P) 0   PHQ-9 Total Score (P) 7   If you checked off any problems, how difficult have these problems made it for you to do your work, take care of things at home, or get along  with other people? (P) Somewhat difficult     PHQ-9 Total Score: (P) 7    BHAVIN 7 anxiety screening tool that patient filled out virtually reviewed by this APRN at today's encounter.    PROMIS scale screening tool that patient filled out virtually reviewed by this APRN at today's encounter.    Previous Provider notes and available records reviewed by this APRN today.   Current Medications:   Current Outpatient Medications   Medication Sig Dispense Refill   • omeprazole (priLOSEC) 40 MG capsule Take 1 capsule by mouth Daily. 30 capsule 11     No current facility-administered medications for this visit.       Physical Exam   Result Review :  No vital signs were taken at today's visit.         Assessment and Plan   Problem List Items Addressed This Visit        Mental Health    Attention deficit hyperactivity disorder, combined type - Primary        Patient will be referred to another APRN within the clinic to assess for stimulant medication for ADHD.  No further follow-up will be necessary with this APRN.  Patient asked if he can see me again if needed to, and informed patient that he would continue to see the APRN that was writing his stimulant.  Informed him that if he chose not to do a stimulant and wanted to try the Wellbutrin that he was welcome to come back and see me.    TREATMENT PLAN/GOALS: Continue supportive psychotherapy efforts and medications as indicated. Treatment and medication options discussed during today's visit. Patient ackowledged and verbally consented to continue with current treatment plan and was educated on the importance of compliance with treatment and follow-up appointments.    DEPRESSION:  Patient screened positive for depression based on a PHQ-9 score of 7 on 12/14/2021. Follow-up recommendations include: Suicide Risk Assessment performed.       MEDICATION ISSUES:  We discussed risks, benefits, and side effects of the above medications and the patient was agreeable with the plan. Patient  was educated on the importance of compliance with treatment and follow-up appointments.  Patient is agreeable to call the office with any worsening of symptoms or onset of side effects. Patient is agreeable to call 911 or go to the nearest ER should he/she begin having SI/HI.      Counseled patient regarding multimodal approach with healthy nutrition, healthy sleep, regular physical activity, social activities, counseling, and medications.      Coping skills reviewed and encouraged positive framing of thoughts     Assisted patient in processing above session content; acknowledged and normalized patient’s thoughts, feelings, and concerns.  Applied  positive coping skills and behavior management in session.  Allowed patient to freely discuss issues without interruption or judgment. Provided safe, confidential environment to facilitate the development of positive therapeutic relationship and encourage open, honest communication. Assisted patient in identifying risk factors which would indicate the need for higher level of care including thoughts to harm self or others and/or self-harming behavior and encouraged patient to contact this office, call 911, or present to the nearest emergency room should any of these events occur. Discussed crisis intervention services and means to access.     MEDS ORDERED DURING VISIT:  No orders of the defined types were placed in this encounter.      I spent 21 minutes caring for Luh on this date of service. This time includes time spent by me in the following activities:preparing for the visit, performing a medically appropriate examination and/or evaluation , documenting information in the medical record and care coordination    Follow Up   No follow-ups on file.    Patient was given instructions and counseling regarding his condition or for health maintenance advice. Please see specific information pulled into the AVS if appropriate.         This document has been electronically  signed by ARUN Dejesus  December 14, 2021 14:20 EST    Part of this note may be an electronic transcription/translation of spoken language to printed text using the Dragon Dictation System.

## 2022-01-11 ENCOUNTER — TELEMEDICINE (OUTPATIENT)
Dept: PSYCHIATRY | Facility: CLINIC | Age: 27
End: 2022-01-11

## 2022-01-11 DIAGNOSIS — R41.840 ATTENTION AND CONCENTRATION DEFICIT: Primary | Chronic | ICD-10-CM

## 2022-01-11 PROCEDURE — 90792 PSYCH DIAG EVAL W/MED SRVCS: CPT | Performed by: NURSE PRACTITIONER

## 2022-01-11 RX ORDER — BUPROPION HYDROCHLORIDE 100 MG/1
100 TABLET, EXTENDED RELEASE ORAL EVERY MORNING
Qty: 30 TABLET | Refills: 0 | Status: SHIPPED | OUTPATIENT
Start: 2022-01-11 | End: 2022-02-10 | Stop reason: DRUGHIGH
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
## 2022-02-10 ENCOUNTER — TELEMEDICINE (OUTPATIENT)
Dept: PSYCHIATRY | Facility: CLINIC | Age: 27
End: 2022-02-10

## 2022-02-10 DIAGNOSIS — R41.840 ATTENTION AND CONCENTRATION DEFICIT: Primary | Chronic | ICD-10-CM

## 2022-02-10 PROCEDURE — 99213 OFFICE O/P EST LOW 20 MIN: CPT | Performed by: NURSE PRACTITIONER

## 2022-02-10 RX ORDER — BUPROPION HYDROCHLORIDE 150 MG/1
150 TABLET ORAL EVERY MORNING
Qty: 30 TABLET | Refills: 0 | Status: SHIPPED | OUTPATIENT
Start: 2022-02-10 | End: 2022-03-15 | Stop reason: DRUGHIGH

## 2022-02-10 NOTE — PROGRESS NOTES
"This provider is located at the [facility] (through [facility]), [address], [facility], [address] using a secure [employer] Video Visit through [employer]. Patient is being seen remotely via telehealth at their home address in [place], and stated they are in a secure environment for this session. The patient's condition being diagnosed/treated is appropriate for telemedicine. The provider identified herself as well as her credentials.   The patient, and/or patients guardian, consent to be seen remotely, and when consent is given they understand that the consent allows for patient identifiable information to be sent to a third party as needed.   They may refuse to be seen remotely at any time. The electronic data is encrypted and password protected, and the patient and/or guardian has been advised of the potential risks to privacy not withstanding such measures.    You have chosen to receive care through a telehealth visit.  Do you consent to use a video/audio connection for your medical care today? Yes        Subjective   Luh Concepcion is a 26 y.o. male who presents today for follow up    Chief Complaint:  Attention and concentration difficulties    Accompanied by: The patient is interviewed alone at today's encounter    History of Present Illness:   The patient describes his mood as improved over the last few weeks.  The patient states his sense of \"self feeling\" and content has improved.  He reports he feels better overall, he is even getting up in the morning going to the gym before work.  He reports when he first tried taking Wellbutrin SR in the morning it made him feel tired in the morning so he has switched it to bedtime dosing.  He reports he has noticed that he has had difficulty talking with people, and comprehending what is being told to him when he is being told something directly.  He reports he feels his ability to communicate and comprehend conversations with " others has worsened.  The patient reports he does feel that the Wellbutrin SR has improved his moods overall, and he really did not realize how bad his moods were until starting Wellbutrin SR.  The patient reports his appetite as good.  The patient reports his sleep as good.  The patient denies any new medical problems or changes in medications since last appointment with this facility.  The patient reports he still has not reached out to the sleep clinic to set up an appointment, and request assistance with this.  The patient reports compliance with his current medication regimen.  The patient denies any other possible side effects or concerns from his current medication regimen.  He does report he drank alcohol pretty heavily this past weekend, and is not sure if that has interfered with his medication or not.  The patient denies any abnormal muscle movements or tics.   The patient would like to adjust his medications at this visit.  The patient denies any suicidal or homicidal ideations, plans, or intent at today's encounter and is convincing.  The patient denies any auditory hallucinations or visual hallucinations.  The patient does not endorse any significant symptoms consistent with huey or psychosis at today's encounter.      Prior Psychiatric Medications:  Strattera - causes GI side effects  Lexapro - made him feel sad and unmotivated, and took away his emotions  Wellbutrin SR  Wellbutrin XL      The following portions of the patient's history were reviewed and updated as appropriate: allergies, current medications, past family history, past medical history, past social history, past surgical history and problem list.          Past Medical History:  Past Medical History:   Diagnosis Date   • Acid reflux        Social History:  Social History     Socioeconomic History   • Marital status: Single   Tobacco Use   • Smoking status: Never Smoker   • Smokeless tobacco: Current User     Types: Snuff   Substance and  Sexual Activity   • Alcohol use: Yes     Comment: 5- 10 drinks per week   • Drug use: Never   • Sexual activity: Yes     Partners: Female       Family History:  Family History   Problem Relation Age of Onset   • Anxiety disorder Mother    • Depression Mother    • Alcohol abuse Mother    • Hypertension Father    • Sleep apnea Father    • Heart attack Maternal Grandfather    • Alcohol abuse Maternal Grandfather    • Diabetes Paternal Grandmother    • Obesity Paternal Grandmother    • Heart attack Paternal Grandmother    • Heart disease Paternal Grandfather    • Dementia Other         maternal side of family   • Drug abuse Other         paternal side of family       Past Surgical History:  Past Surgical History:   Procedure Laterality Date   • APPENDECTOMY  03/2018       Problem List:  Patient Active Problem List   Diagnosis   • Gastroesophageal reflux disease   • Chronic bilateral low back pain without sciatica   • Morbidly obese (HCC)   • Depression with anxiety   • Mixed hyperlipidemia   • Borderline diabetes   • Vitamin D deficiency   • Attention deficit hyperactivity disorder, combined type   • Mild episode of recurrent major depressive disorder (HCC)       Allergy:   No Known Allergies     Current Medications:   Current Outpatient Medications   Medication Sig Dispense Refill   • buPROPion XL (Wellbutrin XL) 150 MG 24 hr tablet Take 1 tablet by mouth Every Morning. 30 tablet 0   • omeprazole (priLOSEC) 40 MG capsule Take 1 capsule by mouth Daily. 30 capsule 11     No current facility-administered medications for this visit.       Review of Symptoms:    Review of Systems   Constitutional: Negative.    HENT: Negative.    Eyes: Negative.    Respiratory: Negative.    Cardiovascular: Negative.    Gastrointestinal: Negative.    Endocrine: Negative.    Genitourinary: Negative.    Musculoskeletal: Negative.    Skin: Negative.    Neurological: Negative.    Psychiatric/Behavioral: Positive for behavioral problems, decreased  concentration and stress. Negative for agitation, dysphoric mood, hallucinations, self-injury, suicidal ideas, negative for hyperactivity and depressed mood. The patient is nervous/anxious.          Physical Exam:   There were no vitals taken for this visit. There is no height or weight on file to calculate BMI.   Due to the remote nature of this encounter (virtual encounter), vitals were unable to be obtained.  Height stated at 68-69 inches.  Weight stated at 230-250 pounds.      Physical Exam  Constitutional:       Appearance: Normal appearance.   Neurological:      Mental Status: He is alert and oriented to person, place, and time.   Psychiatric:         Attention and Perception: Attention normal. He is attentive.         Mood and Affect: Mood and affect normal.         Speech: Speech normal.         Behavior: Behavior normal. Behavior is cooperative.         Thought Content: Thought content normal. Thought content is not paranoid or delusional. Thought content does not include homicidal or suicidal ideation. Thought content does not include homicidal or suicidal plan.         Cognition and Memory: Cognition and memory normal.         Judgment: Judgment is not impulsive or inappropriate.         Mental Status Exam:   Hygiene:   good  Cooperation:  Cooperative  Eye Contact:  Good  Psychomotor Behavior:  Appropriate  Affect:  Appropriate  Mood: normal  Hopelessness: Denies  Speech:  Normal  Thought Process:  Linear  Thought Content:  Normal and Mood congruent  Suicidal:  None  Homicidal:  None  Hallucinations:  None  Delusion:  None  Memory:  Intact  Orientation:  Person, Place, Time and Situation  Reliability:  good  Insight:  Good  Judgement:  Good  Impulse Control:  Good  Physical/Medical Issues:  no acute physical/medical issues at today's encounter       PHQ-9 Depression Screening  Little interest or pleasure in doing things? (P) 0   Feeling down, depressed, or hopeless? (P) 0   Trouble falling or staying  asleep, or sleeping too much? (P) 0   Feeling tired or having little energy? (P) 0   Poor appetite or overeating? (P) 0   Feeling bad about yourself - or that you are a failure or have let yourself or your family down? (P) 1   Trouble concentrating on things, such as reading the newspaper or watching television? (P) 1   Moving or speaking so slowly that other people could have noticed? Or the opposite - being so fidgety or restless that you have been moving around a lot more than usual? (P) 1   Thoughts that you would be better off dead, or of hurting yourself in some way? (P) 0   PHQ-9 Total Score (P) 3   If you checked off any problems, how difficult have these problems made it for you to do your work, take care of things at home, or get along with other people? (P) Not difficult at all     PHQ-9 Total Score: (P) 3      Feeling nervous, anxious or on edge: (P) Several days  Not being able to stop or control worrying: (P) Several days  Worrying too much about different things: (P) Not at all  Trouble Relaxing: (P) Not at all  Being so restless that it is hard to sit still: (P) Several days  Feeling afraid as if something awful might happen: (P) Several days  Becoming easily annoyed or irritable: (P) More than half the days  BHAVIN 7 Total Score: (P) 6  If you checked any problems, how difficult have these problems made it for you to do your work, take care of things at home, or get along with other people: (P) Somewhat difficult      PROMIS scale screening tool that patient filled out virtually reviewed by this APRN at today's encounter.        Lab Results:   No visits with results within 1 Month(s) from this visit.   Latest known visit with results is:   Office Visit on 11/13/2019   Component Date Value Ref Range Status   • Endomysial IgA 11/13/2019 Negative  Negative Final   • Tissue Transglutaminase IgA 11/13/2019 <2  0 - 3 U/mL Final                                  Negative        0 -  3                                 Weak Positive   4 - 10                                Positive           >10   Tissue Transglutaminase (tTG) has been identified   as the endomysial antigen.  Studies have demonstr-   ated that endomysial IgA antibodies have over 99%   specificity for gluten sensitive enteropathy.   • IgA 11/13/2019 155  90 - 386 mg/dL Final   • Total Cholesterol 11/13/2019 264* 0 - 200 mg/dL Final   • Triglycerides 11/13/2019 453* 0 - 150 mg/dL Final   • HDL Cholesterol 11/13/2019 30* 40 - 60 mg/dL Final   • LDL Cholesterol  11/13/2019    Final    Unable to calculate   • VLDL Cholesterol 11/13/2019    Final    Unable to calculate   • LDL/HDL Ratio 11/13/2019    Final    Unable to calculate   • Hemoglobin A1C 11/13/2019 5.62* 4.80 - 5.60 % Final   • LDL Cholesterol  11/13/2019 169* 0 - 100 mg/dL Final         Assessment/Plan   Problems Addressed this Visit     None      Visit Diagnoses     Attention and concentration deficit  (Chronic)   -  Primary    Relevant Medications    buPROPion XL (Wellbutrin XL) 150 MG 24 hr tablet      Diagnoses       Codes Comments    Attention and concentration deficit    -  Primary ICD-10-CM: R41.840  ICD-9-CM: 799.51           Visit Diagnoses:    ICD-10-CM ICD-9-CM   1. Attention and concentration deficit  R41.840 799.51          GOALS:  Short Term Goals: Patient will be compliant with medication, and patient will have no significant medication related side effects.  Patient will be engaged in psychotherapy as indicated.  Patient will report subjective improvement of symptoms.  Long term goals: To stabilize mood and treat/improve subjective symptoms, the patient will stay out of the hospital, the patient will be at an optimal level of functioning, and the patient will take all medications as prescribed.  The patient verbalized understanding and agreement with goals that were mutually set.      TREATMENT PLAN: Continue supportive psychotherapy efforts and medications as indicated.  Medication and  treatment options, both pharmacological and non-pharmacological treatment options, discussed during today's visit, including any off label use of medication. Patient acknowledged and verbally consented with current treatment plan and was educated on the importance of compliance with treatment and follow-up appointments.      -Discontinue bupropion SR and start Wellbutrin  mg by mouth once daily in the morning for difficulties with attention/concentration and also for mood.  -It is recommended for patient to reschedule for sleep medicine.      MEDICATION ISSUES:  Discussed medication options and treatment plan of prescribed medication, any off label use of medication, as well as the risks, benefits, any black box warnings including increased suicidality, and side effects including but not limited to potential falls, dizziness, possible impaired driving, GI side effects (change in appetite, abdominal discomfort, nausea, vomiting, diarrhea, and/or constipation), dry mouth, somnolence, sedation, insomnia, activation, agitation, irritation, tremors, abnormal muscle movements or disorders, headache, sweating, possible bruising or rare bleeding, electrolyte and/or fluid abnormalities, change in blood pressure/heart rate/and or heart rhythm, sexual dysfunction, and metabolic adversities among others. Patient and/or guardian agreeable to call the office with any worsening of symptoms or onset of side effects, or if any concerns or questions arise.  The contact information for the office is made available to the patient and/or guardian.  Patient and/or guardian agreeable to call 911 or go to the nearest ER should they begin having any SI/HI, or if any urgent concerns arise. No medication side effects or related complaints today.      SUICIDE RISK ASSESSMENT AND SAFETY PLAN: Unalterable demographics and a history of mental health intervention indicate this patient is in a high risk category compared to the general  population. At present, the patient denies active SI/HI, intentions, or plans at this time and agrees to seek immediate care should such thoughts develop. The patient verbalizes understanding of how to access emergency care if needed and agrees to do so. Consideration of suicide risk and protective factors such as history, current presentation, individual strengths and weaknesses, psychosocial and environmental stressors and variables, psychiatric illness and symptoms, medical conditions and pain, took place in this interview. Based on those considerations, the patient is determined: within individual baseline and presenting no imminent risk for suicide or homicide. Other recommendations: The patient does not meet the criteria for inpatient admission and is not a safety risk to self or others at today's visit. Inpatient treatment offers no significant advantages over outpatient treatment for this patient at today's visit.  The patient was given ample time for questions and fully participated in treatment planning.  The patient was encouraged to call the clinic with any questions or concerns.  The patient was informed of access to emergency care. If patient were to develop any significant symptomatology, suicidal ideation, homicidal ideation, any concerns, or feel unsafe at any time they are to call the clinic and if unable to get immediate assistance should immediately call 911 or go to the nearest emergency room.  Patient contracted verbally for the following: If you are experiencing an emotional crisis or have thoughts of harming yourself or others, please go to your nearest local emergency room or call 911. Will continue to re-assess medication response and side effects frequently to establish efficacy and ensure safety. Risks, any black box warnings, side effects, off label usage, and benefits of medication and treatment discussed with patient, along with potential adverse side effects of current and/or newly  prescribed medication, alternative treatment options, and OTC medications.  Patient verbalized understanding of potential risks, any off label use of medication, any black box warnings, and any side effects in their own words. The patient verbalized understanding and agreed to comply with the safety plan discussed in their own words.  Patient given the number to the office. Number also discussed of the 24- hour suicide hotline.         MEDS ORDERED DURING VISIT:  New Medications Ordered This Visit   Medications   • buPROPion XL (Wellbutrin XL) 150 MG 24 hr tablet     Sig: Take 1 tablet by mouth Every Morning.     Dispense:  30 tablet     Refill:  0       Return in about 4 weeks (around 3/10/2022), or if symptoms worsen or fail to improve, for Next scheduled follow up and Recheck.         Functional Status: Moderate impairment     Prognosis: Good with Ongoing Treatment             This document has been electronically signed by ARUN Bonilla  February 10, 2022 09:01 EST    Some of the data in this electronic note has been brought forward from a previous encounter, any necessary changes have been made, it has been reviewed by this APRN, and it is accurate.    Please note that portions of this note were completed with a voice recognition program.

## 2022-03-15 ENCOUNTER — TELEMEDICINE (OUTPATIENT)
Dept: PSYCHIATRY | Facility: CLINIC | Age: 27
End: 2022-03-15

## 2022-03-15 DIAGNOSIS — R41.840 ATTENTION AND CONCENTRATION DEFICIT: Chronic | ICD-10-CM

## 2022-03-15 PROCEDURE — 99213 OFFICE O/P EST LOW 20 MIN: CPT | Performed by: NURSE PRACTITIONER

## 2022-03-15 RX ORDER — BUPROPION HYDROCHLORIDE 100 MG/1
100 TABLET, EXTENDED RELEASE ORAL 2 TIMES DAILY
Qty: 60 TABLET | Refills: 0 | Status: SHIPPED | OUTPATIENT
Start: 2022-03-15 | End: 2022-06-05 | Stop reason: SDUPTHER

## 2022-03-15 NOTE — PROGRESS NOTES
This provider is located at the [facility] (through [facility]), [address], [place], [address] using a secure [email or web address] Video Visit through [employer]. Patient is being seen remotely via telehealth at their home address in [place], and stated they are in a secure environment for this session. The patient's condition being diagnosed/treated is appropriate for telemedicine. The provider identified herself as well as her credentials.   The patient, and/or patients guardian, consent to be seen remotely, and when consent is given they understand that the consent allows for patient identifiable information to be sent to a third party as needed.   They may refuse to be seen remotely at any time. The electronic data is encrypted and password protected, and the patient and/or guardian has been advised of the potential risks to privacy not withstanding such measures.    You have chosen to receive care through a telehealth visit.  Do you consent to use a video/audio connection for your medical care today? Yes        Subjective   Luh Concepcion is a 26 y.o. male who presents today for follow up    Chief Complaint:  Attention and concentration difficulties    Accompanied by: The patient is interviewed alone at today's encounter    History of Present Illness:  The patient describes his mood as pretty good since his last encounter with this APRN.  The patient states he has recently started working out with his cousin, who is a .  He reports going to the gym 6 days a week, as well as eating 5 smaller meals throughout the day.  He reports he has been drinking a gallon and 1/2 to 2 gallons of water per day.  He reports he was using pre-workout before going to the gym, but has switched over to caffeine pills.  He reports consuming approximately 1000 to 1200 mg of caffeine per day due to his daytime fatigue.  This APRN has discussed with the patient caution with excess  caffeine use.  He reports that there is a day he does not need as much energy or alertness he will not take as much caffeine.  He reports he has cut back his alcohol intake significantly recently also.  He reports he has scheduled an appointment with sleep medicine and has back coming up next month.  The patient reports his appetite as good.  The patient reports his sleep as good.  He reports occasional nightmares or bad dreams.  The patient denies any other new medical problems or changes in medications since last appointment with this facility.  The patient reports compliance with his current medication regimen.  The patient denies any side effects or concerns from his current medication regimen.  The patient denies any abnormal muscle movements or tics.   The patient would like to adjust his medications at this visit.  The patient reports he feels his moods are more constant on Wellbutrin XL compared Wellbutrin SR, but he does not get that boost in his mood that he did with the Wellbutrin SR on his current Wellbutrin XL dosage.  This APRN and the patient discussed increasing the Wellbutrin XL dosage versus switching back to the Wellbutrin SR formulation, but increasing to twice daily dosing.  The patient would like to try Wellbutrin SR twice daily dosing.  The patient denies any suicidal or homicidal ideations, plans, or intent at today's encounter and is convincing.  The patient denies any auditory hallucinations or visual hallucinations.  The patient does not endorse any significant symptoms consistent with huey or psychosis at today's encounter.      Prior Psychiatric Medications:  Strattera - causes GI side effects  Lexapro - made him feel sad and unmotivated, and took away his emotions  Wellbutrin SR  Wellbutrin XL      The following portions of the patient's history were reviewed and updated as appropriate: allergies, current medications, past family history, past medical history, past social history, past  surgical history and problem list.          Past Medical History:  Past Medical History:   Diagnosis Date   • Acid reflux        Social History:  Social History     Socioeconomic History   • Marital status: Single   Tobacco Use   • Smoking status: Never Smoker   • Smokeless tobacco: Current User     Types: Snuff   Substance and Sexual Activity   • Alcohol use: Yes     Comment: 5- 10 drinks per week   • Drug use: Never   • Sexual activity: Yes     Partners: Female       Family History:  Family History   Problem Relation Age of Onset   • Anxiety disorder Mother    • Depression Mother    • Alcohol abuse Mother    • Hypertension Father    • Sleep apnea Father    • Heart attack Maternal Grandfather    • Alcohol abuse Maternal Grandfather    • Diabetes Paternal Grandmother    • Obesity Paternal Grandmother    • Heart attack Paternal Grandmother    • Heart disease Paternal Grandfather    • Dementia Other         maternal side of family   • Drug abuse Other         paternal side of family       Past Surgical History:  Past Surgical History:   Procedure Laterality Date   • APPENDECTOMY  03/2018       Problem List:  Patient Active Problem List   Diagnosis   • Gastroesophageal reflux disease   • Chronic bilateral low back pain without sciatica   • Morbidly obese (HCC)   • Depression with anxiety   • Mixed hyperlipidemia   • Borderline diabetes   • Vitamin D deficiency   • Attention deficit hyperactivity disorder, combined type   • Mild episode of recurrent major depressive disorder (HCC)       Allergy:   No Known Allergies     Current Medications:   Current Outpatient Medications   Medication Sig Dispense Refill   • buPROPion SR (Wellbutrin SR) 100 MG 12 hr tablet Take 1 tablet by mouth 2 (Two) Times a Day. 60 tablet 0   • omeprazole (priLOSEC) 40 MG capsule Take 1 capsule by mouth Daily. 30 capsule 11     No current facility-administered medications for this visit.       Review of Symptoms:    Review of Systems    Constitutional: Negative.    HENT: Negative.    Eyes: Negative.    Respiratory: Negative.    Cardiovascular: Negative.    Gastrointestinal: Negative.    Endocrine: Negative.    Genitourinary: Negative.    Musculoskeletal: Negative.    Skin: Negative.    Neurological: Negative.    Psychiatric/Behavioral: Positive for behavioral problems, decreased concentration and stress. Negative for agitation, dysphoric mood, hallucinations, self-injury, suicidal ideas, negative for hyperactivity and depressed mood. The patient is nervous/anxious.          Physical Exam:   There were no vitals taken for this visit. There is no height or weight on file to calculate BMI.   Due to the remote nature of this encounter (virtual encounter), vitals were unable to be obtained.  Height stated at 68-69 inches.  Weight stated at around 228 pounds.      Physical Exam  Constitutional:       Appearance: Normal appearance.   Neurological:      Mental Status: He is alert and oriented to person, place, and time.   Psychiatric:         Attention and Perception: Attention normal. He is attentive.         Mood and Affect: Mood and affect normal.         Speech: Speech normal.         Behavior: Behavior normal. Behavior is cooperative.         Thought Content: Thought content normal. Thought content is not paranoid or delusional. Thought content does not include homicidal or suicidal ideation. Thought content does not include homicidal or suicidal plan.         Cognition and Memory: Cognition and memory normal.         Judgment: Judgment is not impulsive or inappropriate.         Mental Status Exam:   Hygiene:   good  Cooperation:  Cooperative  Eye Contact:  Good  Psychomotor Behavior:  Appropriate  Affect:  Appropriate  Mood: normal  Hopelessness: Denies  Speech:  Normal  Thought Process:  Linear  Thought Content:  Normal and Mood congruent  Suicidal:  None  Homicidal:  None  Hallucinations:  None  Delusion:  None  Memory:  Intact  Orientation:   Person, Place, Time and Situation  Reliability:  good  Insight:  Good  Judgement:  Good  Impulse Control:  Good  Physical/Medical Issues:  no acute physical/medical issues at today's encounter       PHQ-9 Depression Screening  Little interest or pleasure in doing things? (P) 0   Feeling down, depressed, or hopeless? (P) 0   Trouble falling or staying asleep, or sleeping too much? (P) 0   Feeling tired or having little energy? (P) 0   Poor appetite or overeating? (P) 0   Feeling bad about yourself - or that you are a failure or have let yourself or your family down? (P) 0   Trouble concentrating on things, such as reading the newspaper or watching television? (P) 1   Moving or speaking so slowly that other people could have noticed? Or the opposite - being so fidgety or restless that you have been moving around a lot more than usual? (P) 0   Thoughts that you would be better off dead, or of hurting yourself in some way? (P) 0   PHQ-9 Total Score (P) 1   If you checked off any problems, how difficult have these problems made it for you to do your work, take care of things at home, or get along with other people? (P) Somewhat difficult     PHQ-9 Total Score: (P) 1      Feeling nervous, anxious or on edge: (P) Several days  Not being able to stop or control worrying: (P) Not at all  Worrying too much about different things: (P) Not at all  Trouble Relaxing: (P) Not at all  Being so restless that it is hard to sit still: (P) Nearly every day  Feeling afraid as if something awful might happen: (P) Not at all  Becoming easily annoyed or irritable: (P) Several days  BHAVIN 7 Total Score: (P) 5  If you checked any problems, how difficult have these problems made it for you to do your work, take care of things at home, or get along with other people: (P) Somewhat difficult      PROMIS scale screening tool that patient filled out virtually reviewed by this APRN at today's encounter.        Lab Results:   No visits with results  within 1 Month(s) from this visit.   Latest known visit with results is:   Office Visit on 11/13/2019   Component Date Value Ref Range Status   • Endomysial IgA 11/13/2019 Negative  Negative Final   • Tissue Transglutaminase IgA 11/13/2019 <2  0 - 3 U/mL Final                                  Negative        0 -  3                                Weak Positive   4 - 10                                Positive           >10   Tissue Transglutaminase (tTG) has been identified   as the endomysial antigen.  Studies have demonstr-   ated that endomysial IgA antibodies have over 99%   specificity for gluten sensitive enteropathy.   • IgA 11/13/2019 155  90 - 386 mg/dL Final   • Total Cholesterol 11/13/2019 264 (A) 0 - 200 mg/dL Final   • Triglycerides 11/13/2019 453 (A) 0 - 150 mg/dL Final   • HDL Cholesterol 11/13/2019 30 (A) 40 - 60 mg/dL Final   • LDL Cholesterol  11/13/2019    Final    Unable to calculate   • VLDL Cholesterol 11/13/2019    Final    Unable to calculate   • LDL/HDL Ratio 11/13/2019    Final    Unable to calculate   • Hemoglobin A1C 11/13/2019 5.62 (A) 4.80 - 5.60 % Final   • LDL Cholesterol  11/13/2019 169 (A) 0 - 100 mg/dL Final         Assessment/Plan   Problems Addressed this Visit    None     Visit Diagnoses     Attention and concentration deficit  (Chronic)       Relevant Medications    buPROPion SR (Wellbutrin SR) 100 MG 12 hr tablet      Diagnoses       Codes Comments    Attention and concentration deficit     ICD-10-CM: R41.840  ICD-9-CM: 799.51           Visit Diagnoses:    ICD-10-CM ICD-9-CM   1. Attention and concentration deficit  R41.840 799.51          GOALS:  Short Term Goals: Patient will be compliant with medication, and patient will have no significant medication related side effects.  Patient will be engaged in psychotherapy as indicated.  Patient will report subjective improvement of symptoms.  Long term goals: To stabilize mood and treat/improve subjective symptoms, the patient will  stay out of the hospital, the patient will be at an optimal level of functioning, and the patient will take all medications as prescribed.  The patient verbalized understanding and agreement with goals that were mutually set.      TREATMENT PLAN: Continue supportive psychotherapy efforts and medications as indicated.  Medication and treatment options, both pharmacological and non-pharmacological treatment options, discussed during today's visit, including any off label use of medication. Patient acknowledged and verbally consented with current treatment plan and was educated on the importance of compliance with treatment and follow-up appointments.      -Discontinue Wellbutrin  mg and start Wellbutrin  mg by mouth twice daily for symptoms of adult ADD.      MEDICATION ISSUES:  Discussed medication options and treatment plan of prescribed medication, any off label use of medication, as well as the risks, benefits, any black box warnings including increased suicidality, and side effects including but not limited to potential falls, dizziness, possible impaired driving, GI side effects (change in appetite, abdominal discomfort, nausea, vomiting, diarrhea, and/or constipation), dry mouth, somnolence, sedation, insomnia, activation, agitation, irritation, tremors, abnormal muscle movements or disorders, headache, sweating, possible bruising or rare bleeding, electrolyte and/or fluid abnormalities, change in blood pressure/heart rate/and or heart rhythm, sexual dysfunction, and metabolic adversities among others. Patient and/or guardian agreeable to call the office with any worsening of symptoms or onset of side effects, or if any concerns or questions arise.  The contact information for the office is made available to the patient and/or guardian.  Patient and/or guardian agreeable to call 911 or go to the nearest ER should they begin having any SI/HI, or if any urgent concerns arise. No medication side  effects or related complaints today.      SUICIDE RISK ASSESSMENT AND SAFETY PLAN: Unalterable demographics and a history of mental health intervention indicate this patient is in a high risk category compared to the general population. At present, the patient denies active SI/HI, intentions, or plans at this time and agrees to seek immediate care should such thoughts develop. The patient verbalizes understanding of how to access emergency care if needed and agrees to do so. Consideration of suicide risk and protective factors such as history, current presentation, individual strengths and weaknesses, psychosocial and environmental stressors and variables, psychiatric illness and symptoms, medical conditions and pain, took place in this interview. Based on those considerations, the patient is determined: within individual baseline and presenting no imminent risk for suicide or homicide. Other recommendations: The patient does not meet the criteria for inpatient admission and is not a safety risk to self or others at today's visit. Inpatient treatment offers no significant advantages over outpatient treatment for this patient at today's visit.  The patient was given ample time for questions and fully participated in treatment planning.  The patient was encouraged to call the clinic with any questions or concerns.  The patient was informed of access to emergency care. If patient were to develop any significant symptomatology, suicidal ideation, homicidal ideation, any concerns, or feel unsafe at any time they are to call the clinic and if unable to get immediate assistance should immediately call 911 or go to the nearest emergency room.  Patient contracted verbally for the following: If you are experiencing an emotional crisis or have thoughts of harming yourself or others, please go to your nearest local emergency room or call 911. Will continue to re-assess medication response and side effects frequently to  establish efficacy and ensure safety. Risks, any black box warnings, side effects, off label usage, and benefits of medication and treatment discussed with patient, along with potential adverse side effects of current and/or newly prescribed medication, alternative treatment options, and OTC medications.  Patient verbalized understanding of potential risks, any off label use of medication, any black box warnings, and any side effects in their own words. The patient verbalized understanding and agreed to comply with the safety plan discussed in their own words.  Patient given the number to the office. Number also discussed of the 24- hour suicide hotline.         MEDS ORDERED DURING VISIT:  New Medications Ordered This Visit   Medications   • buPROPion SR (Wellbutrin SR) 100 MG 12 hr tablet     Sig: Take 1 tablet by mouth 2 (Two) Times a Day.     Dispense:  60 tablet     Refill:  0       Return in about 4 weeks (around 4/12/2022), or if symptoms worsen or fail to improve, for Next scheduled follow up and Recheck.         Functional Status: Moderate impairment     Prognosis: Good with Ongoing Treatment             This document has been electronically signed by ARUN Bonilla  March 15, 2022 16:45 EDT    Some of the data in this electronic note has been brought forward from a previous encounter, any necessary changes have been made, it has been reviewed by this APRN, and it is accurate.    Please note that portions of this note were completed with a voice recognition program.

## 2022-04-13 ENCOUNTER — OFFICE VISIT (OUTPATIENT)
Dept: SLEEP MEDICINE | Facility: HOSPITAL | Age: 27
End: 2022-04-13

## 2022-04-13 VITALS
BODY MASS INDEX: 33.74 KG/M2 | SYSTOLIC BLOOD PRESSURE: 132 MMHG | OXYGEN SATURATION: 97 % | DIASTOLIC BLOOD PRESSURE: 62 MMHG | HEART RATE: 64 BPM | WEIGHT: 222.6 LBS | HEIGHT: 68 IN

## 2022-04-13 DIAGNOSIS — E66.09 CLASS 1 OBESITY DUE TO EXCESS CALORIES WITHOUT SERIOUS COMORBIDITY WITH BODY MASS INDEX (BMI) OF 33.0 TO 33.9 IN ADULT: ICD-10-CM

## 2022-04-13 DIAGNOSIS — R06.83 SNORING: Primary | ICD-10-CM

## 2022-04-13 DIAGNOSIS — G47.33 OBSTRUCTIVE SLEEP APNEA, ADULT: ICD-10-CM

## 2022-04-13 PROCEDURE — 99203 OFFICE O/P NEW LOW 30 MIN: CPT | Performed by: INTERNAL MEDICINE

## 2022-05-01 NOTE — PROGRESS NOTES
Chief Complaint  Snoring and nonrestorative sleep.    Subjective         Luh Concepcion presents to [facility] for the evaluation of snoring and nonrestorative sleep.  He care provider is ARUN Gamble,.  He is seen in person in the sleep clinic.  History of Present Illness  Patient says he has had snoring noted for about 6 years and its been worse in the past year.  He awakens frequently at night.  He admits he sleeps with his dog and the dog awakens and some 2.  He denies any noted apneas.  He occasionally has awaken gasping for breath.  His father has a history of obstructive sleep apnea.  Patient is not rested on arising in the morning.  He denies morning headaches.    He has history of reflux symptoms.  He denies ever breaking his nose.  He has been told he had a deviated septum and has problems with allergies.  He is sleepy during the day and will fall asleep if sitting quietly.  He sometimes gets sleepy driving.  He has occasional kicking of his legs at night.  He denies that they keep him awake however.  He denies having chronic pain that keeps him awake.    He goes to bed between 8 PM and 10 PM.  He would fall asleep in 5 to 10 minutes.  He awakens 4-6 times during the night.  He gets 6 to 7 hours of sleep and not rested.  He denies having hypertension, diabetes, coronary artery disease.    Past medical history:    Allergies: He has environmental allergies.    Habits: Tobacco: He uses e-cigarettes and snuff.    Ethanol: He says he has 10-15 drinks per day on weekends but has been trying to cut back in recent.    Caffeine: Yes 1 to 2 cups of coffee a day    Medical illnesses: Reflux    Medications: Bupropion and omeprazole    Surgeries: Appendectomy and wisdom teeth extraction    Family history: Positives include diabetes, hypertension, heart disease, obesity, sleep apnea.    Review of systems: Positives include fatigue, back pain.  Other systems were reviewed and  "reportedly negative.    [place] was 15/24      Objective   Vital Signs:   /62 (BP Location: Left arm, Patient Position: Sitting, Cuff Size: Adult)   Pulse 64   Ht 172.7 cm (68\")   Wt 101 kg (222 lb 9.6 oz)   SpO2 97%   BMI 33.85 kg/m²   Physical Exam patient appears to be awake and alert.  Does not appear to be in acute respiratory distress.    He is normocephalic.  He has Mallampati class III anatomy.    Lungs clear.    Cardiac exam revealed normal S1-S2    Extremity no edema.  Result Review :           Assessment and Plan   Diagnoses and all orders for this visit:    1. Snoring (Primary)  -     Home Sleep Study; Future    2. Obstructive sleep apnea, adult  -     Home Sleep Study; Future    3. Class 1 obesity due to excess calories without serious comorbidity with body mass index (BMI) of 33.0 to 33.9 in adult    Patient gives a history of snoring and nonrestorative sleep.  He gives an excellent story for obstructive sleep apnea.  We will plan to proceed to home sleep testing.  We have discussed possible therapies including CPAP, weight control, oral appliance, and surgery.  We have discussed the consequences of long-term untreated obstructive sleep apnea.  These include hypertension, diabetes, heart disease, stroke, and dementia.  He is encouraged to lose weight.  He encouraged to avoid alcohol near bedtime.  He is encouraged this lateral position sleep.  We will plan to see back after his study.         I spent 35 minutes caring for Luh on this date of service. This time includes time spent by me in the following activities:obtaining and/or reviewing a separately obtained history, performing a medically appropriate examination and/or evaluation , counseling and educating the patient/family/caregiver, ordering medications, tests, or procedures and documenting information in the medical record  Follow Up   Return for Follow up after study, Next scheduled follow-up.  Patient was given instructions and " counseling regarding his condition or for health maintenance advice. Please see specific information pulled into the AVS if appropriate.   Jed Perez MD [facility]  Sleep Medicine  Pulmonary and Critical Care Medicine

## 2022-06-05 DIAGNOSIS — R41.840 ATTENTION AND CONCENTRATION DEFICIT: Chronic | ICD-10-CM

## 2022-06-06 RX ORDER — BUPROPION HYDROCHLORIDE 100 MG/1
100 TABLET, EXTENDED RELEASE ORAL 2 TIMES DAILY
Qty: 60 TABLET | Refills: 0 | Status: SHIPPED | OUTPATIENT
Start: 2022-06-06

## 2022-06-06 NOTE — TELEPHONE ENCOUNTER
Tried to call patient no answer.  I sent a [employer] message for the patient to call the office.

## 2023-05-28 DIAGNOSIS — K21.9 GASTROESOPHAGEAL REFLUX DISEASE WITHOUT ESOPHAGITIS: ICD-10-CM

## 2023-05-30 RX ORDER — OMEPRAZOLE 40 MG/1
40 CAPSULE, DELAYED RELEASE ORAL DAILY
Qty: 30 CAPSULE | Refills: 11 | OUTPATIENT
Start: 2023-05-30

## 2023-10-24 ENCOUNTER — TELEMEDICINE (OUTPATIENT)
Dept: PSYCHIATRY | Facility: CLINIC | Age: 28
End: 2023-10-24
Payer: COMMERCIAL

## 2023-10-24 DIAGNOSIS — F41.9 ANXIETY DISORDER, UNSPECIFIED TYPE: Chronic | ICD-10-CM

## 2023-10-24 DIAGNOSIS — F33.0 MILD EPISODE OF RECURRENT MAJOR DEPRESSIVE DISORDER: Primary | Chronic | ICD-10-CM

## 2023-10-24 DIAGNOSIS — R41.840 ATTENTION AND CONCENTRATION DEFICIT: Chronic | ICD-10-CM

## 2023-10-24 PROCEDURE — 99214 OFFICE O/P EST MOD 30 MIN: CPT | Performed by: NURSE PRACTITIONER

## 2023-10-24 RX ORDER — BUPROPION HYDROCHLORIDE 150 MG/1
150 TABLET ORAL EVERY MORNING
Qty: 30 TABLET | Refills: 0 | Status: SHIPPED | OUTPATIENT
Start: 2023-10-24

## 2023-10-24 NOTE — PROGRESS NOTES
This provider is located at the [facility] (through [facility]), [address], [facility], [address] using a secure MyChart Video Visit through [employer]. Patient is being seen remotely via telehealth at their home address in [place], and stated they are in a secure environment for this session. The patient's condition being diagnosed/treated is appropriate for telemedicine. The provider identified herself as well as her credentials.   The patient, and/or patients guardian, consent to be seen remotely, and when consent is given they understand that the consent allows for patient identifiable information to be sent to a third party as needed.   They may refuse to be seen remotely at any time. The electronic data is encrypted and password protected, and the patient and/or guardian has been advised of the potential risks to privacy not withstanding such measures.    You have chosen to receive care through a telehealth visit.  Do you consent to use a video/audio connection for your medical care today? Yes    Patient identifiers utilized: Name and date of birth.    Patient verbally confirmed consent for today's encounter  10/24/2023 .    The patient does verbally confirm they are being seen today while physically located in the [facility].  This provider/this APRN is licensed in the [facility] where the patient is located/being seen.     Vanessa Concepcion is a 28 y.o. male who presents today for  reestablishment with behavioral health for reported psychiatric symptoms    Chief Complaint: To reestablish care for medication management: Depression, anxiety, and attention and concentration difficulties    Accompanied by: The patient is interviewed alone at today's encounter    History of Present Illness:  The patient has not been seen since 3/15/2022.  The patient reports he stopped taking his bupropion when he was feeling better mentally and physically, and  things were going good in his life at that time.  The patient reports he has had a lot of changes over the past several months, and reports he has even changed his job twice since his last encounter with this APRN.  The patient reports he currently enjoys his new job, but there have already been changes in his new position and he has been given tasks/duties that are not part of his position.  The patient reports he is having to learn a lot of new things, but reports he is currently enjoying his work.  The patient reports he also recently bought a house, a boat, and a new vehicle.  He reports he has had a lot of life changes recently, changes that are for the good, but also changes that come with their own stressors.  The patient reports he is currently in a healthy relationship, and this has been good for him.  He reports his girlfriend is very supportive of him.  He reports over the last several months he has noticed he has not been feeling as good both mentally and physically.  He reports he has been having trouble with memory.  He reports he was having memory issues/concerns before starting bupropion previously, and reports this did improve with the use of bupropion.  The patient reports he finds himself not enjoying things he used to enjoy.  He reports he does not even enjoy listening to music in his truck in the morning on the way to work, and  music has always been something that he enjoys.  He reports he used to have a more positive outlook, but since all of the recent life changes and being without bupropion he does not have as positive of an outlook as he previously had.  The patient reports he felt like his previous medication management with bupropion helped with his drive and motivation, and it helped every aspect of his life mesh together in being more fluent.  He reports he feels he has lost some of that.  The patient reports with his reported depressive and anxious symptoms that have been returning  over the past several months he rates his symptoms of depression at a 5-6 out of 10 on a 0-10 scale with 10 being the worst.  The patient reports he has a hard time seeing the good things after work, and finding the positives in different situations.  The patient rates his symptoms of anxiety at a 4-5 out of 10 on a 0-10 scale with 10 being the worst.  The patient reports his appetite is good.  The patient reports he has not been taking care of himself as much as he previously was, and he stopped going to the gym.  The patient reports he has probably gained around 40 pounds since his last encounter with this APRN/office.  The patient reports he was also drinking alcohol more than he previously had been, especially on the weekends, but reports he is working on decreasing his overall alcohol intake.  The patient reports he has decreased his caffeine intake from around 1200 mg of caffeine a day to around 400 to 600 mg of caffeine per day.  The patient reports his sleep as fair.  He reports he oftentimes stays up too late, and does not feel rested in the mornings.  The patient denies any new medical problems or changes in his medical history since his last encounter with this APRN.  The patient denies any auditory or visual hallucinations.  The patient denies any symptoms significant of huey or psychosis.  He denies any reckless or impulsive behaviors.  He does not report any self-harming behaviors.  The patient denies any suicidal or homicidal ideations, plans, or intent.  The patient reports he felt like bupropion was a good medication for him, and he is interested in restarting bupropion at today's encounter.  The patient reports he thinks he likes the twice daily dosing of bupropion better, but also knows when he skipped the second dosage of bupropion in the daytime he almost felt worse, and he would like to restart bupropion XL once daily dosing at this time, and we can change the dosing at a later date once he  gets the medication back in his system and he has made taking medications part of his routine regimen.  The patient does verbally contract for safety at today's encounter and is in verbal agreement with the safety/crisis plan. The patient reports in their own words that they will reach out to this APRN/office prior to next scheduled appointment if there is any worsening of mood, any new psychiatric symptoms, any medication side effects or concerns, any concern for safety to self or others, any suicidal or homicidal ideations plans or intent, or any concerns, or they will call 911, call or text the suicide and crisis lifeline at [phone], or go to the closest emergency department.      Prior Psychiatric Medications:  -Strattera - causes GI side effects  -Lexapro - made him feel sad and unmotivated, and took away his emotions  -Wellbutrin SR  -Wellbutrin XL      The following portions of the patient's history were reviewed and updated as appropriate: allergies, current medications, past family history, past medical history, past social history, past surgical history and problem list.          Past Medical History:  Past Medical History:   Diagnosis Date    Acid reflux        Social History:  Social History     Socioeconomic History    Marital status: Single   Tobacco Use    Smoking status: Never    Smokeless tobacco: Current     Types: Snuff, Chew   Vaping Use    Vaping Use: Every day    Substances: Nicotine    Devices: Pre-filled or refillable cartridge   Substance and Sexual Activity    Alcohol use: Yes     Alcohol/week: 15.0 standard drinks of alcohol     Types: 15 Shots of liquor per week     Comment: 10 OR MORE 2-4 TIMES A MONTH    Drug use: Never    Sexual activity: Yes     Partners: Female       Family History:  Family History   Problem Relation Age of Onset    Anxiety disorder Mother     Depression Mother     Alcohol abuse Mother     Diabetes Father     Heart disease Father     Obesity Father     Hypertension  Father     Sleep apnea Father     Heart attack Maternal Grandfather     Alcohol abuse Maternal Grandfather     Diabetes Paternal Grandmother     Obesity Paternal Grandmother     Heart attack Paternal Grandmother     Heart disease Paternal Grandfather     Dementia Other         maternal side of family    Drug abuse Other         paternal side of family       Past Surgical History:  Past Surgical History:   Procedure Laterality Date    APPENDECTOMY  03/2018    WISDOM TOOTH EXTRACTION         Problem List:  Patient Active Problem List   Diagnosis    Gastroesophageal reflux disease    Chronic bilateral low back pain without sciatica    Morbidly obese    Depression with anxiety    Mixed hyperlipidemia    Borderline diabetes    Vitamin D deficiency    Attention deficit hyperactivity disorder, combined type    Mild episode of recurrent major depressive disorder       Allergy:   No Known Allergies     Current Medications:   Current Outpatient Medications   Medication Sig Dispense Refill    buPROPion XL (Wellbutrin XL) 150 MG 24 hr tablet Take 1 tablet by mouth Every Morning. 30 tablet 0    omeprazole (priLOSEC) 40 MG capsule Take 1 capsule by mouth Daily. 30 capsule 11     No current facility-administered medications for this visit.       Review of Symptoms:    Review of Systems   Psychiatric/Behavioral:  Positive for decreased concentration, sleep disturbance, depressed mood and stress. Negative for agitation, behavioral problems, dysphoric mood, hallucinations, self-injury, suicidal ideas and negative for hyperactivity. The patient is nervous/anxious.          Physical Exam:   There were no vitals taken for this visit. There is no height or weight on file to calculate BMI.   Due to the remote nature of this encounter (virtual encounter), vitals were unable to be obtained.  Height stated at 68-69 inches.  Weight stated at around 240 pounds.      Physical Exam  Constitutional:       Appearance: Normal appearance.    Neurological:      Mental Status: He is alert and oriented to person, place, and time.   Psychiatric:         Attention and Perception: Attention normal.         Mood and Affect: Affect normal. Mood is anxious and depressed.         Speech: Speech normal.         Behavior: Behavior normal. Behavior is cooperative.         Thought Content: Thought content normal. Thought content is not paranoid or delusional. Thought content does not include homicidal or suicidal ideation. Thought content does not include homicidal or suicidal plan.         Cognition and Memory: Cognition and memory normal.         Judgment: Judgment normal.         Mental Status Exam:   Hygiene:   good  Cooperation:  Cooperative  Eye Contact:  Good  Psychomotor Behavior:  Appropriate  Affect:  Appropriate  Mood: depressed and anxious  Hopelessness: Denies  Speech:  Normal  Thought Process:  Linear  Thought Content:  Normal and Mood congruent  Suicidal:  None  Homicidal:  None  Hallucinations:  None  Delusion:  None  Memory:  Intact  Orientation:  Person, Place, Time and Situation  Reliability:  good  Insight:  Good  Judgement:  Good  Impulse Control:  Good  Physical/Medical Issues:   no acute physical/medical issues at today's encounter          Lab Results:   No visits with results within 1 Month(s) from this visit.   Latest known visit with results is:   Office Visit on 11/13/2019   Component Date Value Ref Range Status    Endomysial IgA 11/13/2019 Negative  Negative Final    Tissue Transglutaminase IgA 11/13/2019 <2  0 - 3 U/mL Final                                  Negative        0 -  3                                Weak Positive   4 - 10                                Positive           >10   Tissue Transglutaminase (tTG) has been identified   as the endomysial antigen.  Studies have demonstr-   ated that endomysial IgA antibodies have over 99%   specificity for gluten sensitive enteropathy.    IgA 11/13/2019 155  90 - 386 mg/dL Final     Total Cholesterol 11/13/2019 264 (H)  0 - 200 mg/dL Final    Triglycerides 11/13/2019 453 (H)  0 - 150 mg/dL Final    HDL Cholesterol 11/13/2019 30 (L)  40 - 60 mg/dL Final    LDL Cholesterol  11/13/2019    Final    Unable to calculate    VLDL Cholesterol 11/13/2019    Final    Unable to calculate    LDL/HDL Ratio 11/13/2019    Final    Unable to calculate    Hemoglobin A1C 11/13/2019 5.62 (H)  4.80 - 5.60 % Final    LDL Cholesterol  11/13/2019 169 (H)  0 - 100 mg/dL Final         Assessment & Plan   Problems Addressed this Visit          Mental Health    Mild episode of recurrent major depressive disorder - Primary    Relevant Medications    buPROPion XL (Wellbutrin XL) 150 MG 24 hr tablet     Other Visit Diagnoses       Anxiety disorder, unspecified type  (Chronic)       Relevant Medications    buPROPion XL (Wellbutrin XL) 150 MG 24 hr tablet    Attention and concentration deficit  (Chronic)       Relevant Medications    buPROPion XL (Wellbutrin XL) 150 MG 24 hr tablet          Diagnoses         Codes Comments    Mild episode of recurrent major depressive disorder    -  Primary ICD-10-CM: F33.0  ICD-9-CM: 296.31     Anxiety disorder, unspecified type     ICD-10-CM: F41.9  ICD-9-CM: 300.00     Attention and concentration deficit     ICD-10-CM: R41.840  ICD-9-CM: 799.51             Visit Diagnoses:    ICD-10-CM ICD-9-CM   1. Mild episode of recurrent major depressive disorder  F33.0 296.31   2. Anxiety disorder, unspecified type  F41.9 300.00   3. Attention and concentration deficit  R41.840 799.51            GOALS:  Short Term Goals: Patient will be compliant with medication, and patient will have no significant medication related side effects.  Patient will be engaged in psychotherapy as indicated.  Patient will report subjective improvement of symptoms.  Long term goals: To stabilize mood and treat/improve subjective symptoms, the patient will stay out of the hospital, the patient will be at an optimal level of  functioning, and the patient will take all medications as prescribed.  The patient verbalized understanding and agreement with goals that were mutually set.      TREATMENT PLAN: Restart supportive psychotherapy efforts and take medications as indicated.  Medication and treatment options, both pharmacological and non-pharmacological treatment options, discussed during today's visit, including any off label use of medication. Patient acknowledged and verbally consented with current treatment plan and was educated on the importance of compliance with treatment and follow-up appointments.      -Begin Wellbutrin  mg by mouth once daily in the morning for mood and also symptoms of difficulties with focus/concentration.      MEDICATION ISSUES:  Discussed medication options and treatment plan of prescribed medication, any off label use of medication, as well as the risks, benefits, any black box warnings including increased suicidality, and side effects including but not limited to potential falls, dizziness, possible impaired driving, GI side effects (change in appetite, abdominal discomfort, nausea, vomiting, diarrhea, and/or constipation), dry mouth, somnolence, sedation, insomnia, activation, agitation, irritation, tremors, abnormal muscle movements or disorders, headache, sweating, possible bruising or rare bleeding, electrolyte and/or fluid abnormalities, change in blood pressure/heart rate/and or heart rhythm, sexual dysfunction, and metabolic adversities among others. Patient and/or guardian agreeable to call the office with any worsening of symptoms or onset of side effects, or if any concerns or questions arise.  The contact information for the office is made available to the patient and/or guardian.  Patient and/or guardian agreeable to call 911 or go to the nearest ER should they begin having any SI/HI, or if any urgent concerns arise. No medication side effects or related complaints today.      VERBAL  INFORMED CONSENT FOR MEDICATION:  The patient was educated that their proposed/prescribed psychotropic medication(s) has potential risks, side effects, adverse effects, and black box warnings; and these have been discussed with the patient.  The patient has been informed that their treatment and medication dosage is to be individualized, and may even be above or below the recommended range/dosage due to patient individualization and response, but medication is prescribed using a shared decision making approach, and no medication or dosage will be prescribed without the patient's verbal consent.  The reason for the use of the medication including any off label use and alternative modes of treatment other than or in addition to medication has been considered and discussed, the probable consequences of not receiving the proposed treatment have been discussed, and any treatment side effects, black box warnings, and cautions associated with treatment have been discussed with the patient.  The patient is allowed ample time to openly discuss and ask questions regarding the proposed medication(s) and treatment plan and the patient verbalizes understanding the reasons for the use of the medication, its potential risks and benefits, other alternative treatment(s), and the probable consequences that may occur if the proposed medication is not given.  The patient has been given ample time to ask questions and study the information and find the information to be specific, accurate, and complete.  The patient gives verbal consent for the medication(s) proposed/prescribed, they verbalized understanding that they can refuse and withdraw consent at any time with the assistance of this APRN, and the patient has verbally confirmed that they are aware, and are willing, to take the prescribed medication and follow the treatment plan with the known possible risks, side effect, black box warnings, and any potential medication  interactions, and the patient reports they will be worse off without this medication and treatment plan.  The patient is advised to contact this APRN/this office if any questions or concerns arise at any time (at [phone]), or call 911/go to the closest emergency department if needed or outside of office hours.      SUICIDE RISK ASSESSMENT AND SAFETY PLAN: Unalterable demographics and a history of mental health intervention indicate this patient is in a high risk category compared to the general population. At present, the patient denies active SI/HI, intentions, or plans at this time and agrees to seek immediate care should such thoughts develop. The patient verbalizes understanding of how to access emergency care if needed and agrees to do so. Consideration of suicide risk and protective factors such as history, current presentation, individual strengths and weaknesses, psychosocial and environmental stressors and variables, psychiatric illness and symptoms, medical conditions and pain, took place in this interview. Based on those considerations, the patient is determined: within individual baseline and presenting no imminent risk for suicide or homicide. Other recommendations: The patient does not meet the criteria for inpatient admission and is not a safety risk to self or others at today's visit. Inpatient treatment offers no significant advantages over outpatient treatment for this patient at today's visit.  The patient was given ample time for questions and fully participated in treatment planning.  The patient was encouraged to call the clinic with any questions or concerns.  The patient was informed of access to emergency care. If patient were to develop any significant symptomatology, suicidal ideation, homicidal ideation, any concerns, or feel unsafe at any time they are to call the clinic and if unable to get immediate assistance should immediately call 911 or go to the nearest emergency room.   Patient contracted verbally for the following: If you are experiencing an emotional crisis or have thoughts of harming yourself or others, please go to your nearest local emergency room or call 911. Will continue to re-assess medication response and side effects frequently to establish efficacy and ensure safety. Risks, any black box warnings, side effects, off label usage, and benefits of medication and treatment discussed with patient, along with potential adverse side effects of current and/or newly prescribed medication, alternative treatment options, and OTC medications.  Patient verbalized understanding of potential risks, any off label use of medication, any black box warnings, and any side effects in their own words. The patient verbalized understanding and agreed to comply with the safety plan discussed in their own words.  Patient given the number to the office. Number also discussed of the 24- hour suicide hotline.         MEDS ORDERED DURING VISIT:  New Medications Ordered This Visit   Medications    buPROPion XL (Wellbutrin XL) 150 MG 24 hr tablet     Sig: Take 1 tablet by mouth Every Morning.     Dispense:  30 tablet     Refill:  0       Return in about 4 weeks (around 11/21/2023), or if symptoms worsen or fail to improve, for Next scheduled follow up and Recheck.         Functional Status: Moderate impairment     Prognosis: Good with Ongoing Treatment             This document has been electronically signed by ARUN Bonilla  October 24, 2023 11:09 EDT    Some of the data in this electronic note has been brought forward from a previous encounter, any necessary changes have been made, it has been reviewed by this APRN, and it is accurate.    Please note that portions of this note were completed with a voice recognition program.

## 2023-11-21 ENCOUNTER — TELEMEDICINE (OUTPATIENT)
Dept: PSYCHIATRY | Facility: CLINIC | Age: 28
End: 2023-11-21
Payer: COMMERCIAL

## 2023-11-21 DIAGNOSIS — R41.840 ATTENTION AND CONCENTRATION DEFICIT: Chronic | ICD-10-CM

## 2023-11-21 DIAGNOSIS — F33.0 MILD EPISODE OF RECURRENT MAJOR DEPRESSIVE DISORDER: Chronic | ICD-10-CM

## 2023-11-21 DIAGNOSIS — F41.3 OTHER MIXED ANXIETY DISORDERS: Primary | Chronic | ICD-10-CM

## 2023-11-21 RX ORDER — BUPROPION HYDROCHLORIDE 100 MG/1
100 TABLET, EXTENDED RELEASE ORAL 2 TIMES DAILY
Qty: 60 TABLET | Refills: 0 | Status: SHIPPED | OUTPATIENT
Start: 2023-11-21

## 2023-11-21 NOTE — PROGRESS NOTES
This provider is located at the [facility] (through [facility]), [address], [place], [address] using a secure [employer] Video Visit through [employer]. Patient is being seen remotely via telehealth at their home address in [place], and stated they are in a secure environment for this session. The patient's condition being diagnosed/treated is appropriate for telemedicine. The provider identified herself as well as her credentials.   The patient, and/or patients guardian, consent to be seen remotely, and when consent is given they understand that the consent allows for patient identifiable information to be sent to a third party as needed.   They may refuse to be seen remotely at any time. The electronic data is encrypted and password protected, and the patient and/or guardian has been advised of the potential risks to privacy not withstanding such measures.    You have chosen to receive care through a telehealth visit.  Do you consent to use a video/audio connection for your medical care today? Yes    Patient identifiers utilized: Name and date of birth.    Patient verbally confirmed consent for today's encounter  11/21/2023 .    The patient does verbally confirm they are being seen today while physically located in the [facility].  This provider/this APRN is licensed in the [facility] where the patient is located/being seen.     Vanessa Concepcion is a 28 y.o. male who presents today for follow up    Chief Complaint: Medication management follow-up: Anxiety    Accompanied by: The patient is interviewed alone at today's encounter    History of Present Illness:  The patient describes his mood as improved since his last encounter with this APRN.  The patient reports his overall anxiety levels have improved, and he feels better since starting Wellbutrin XL, but he still deals with social anxiety.  The patient rates his symptoms of depression at a 2-3/10  on a 0-10 scale, with 10 being the worst.   The patient rates his symptoms of anxiety at a 2-3/10 on a 0-10 scale, with 10 being the worst in general and on average.  The patient reports his anxiety symptoms are much higher before, and during, social events.  He reports his symptoms of anxiety would be around a 6-7/10 on a 0-10 scale with 10 being the worst during social events such as just hanging out with his friends.  He reports he has actually not went to several social events recently due to his anxiety.  The patient reports his appetite as good.  The patient reports his sleep as improved some, and reports he has not been taking any OTC sleep aids recently.  He reports he does stay up late some nights.  The patient does not report any new medical problems or changes in medications since last appointment with this facility.  The patient reports compliance with his current medication regimen.  The patient denies any side effects or concerns from his current medication regimen other than if he drinks alcohol while taking Wellbutrin it causes him to have a significant hangover feeling the next day, much more than what a regular hangover would be.  The patient reports he has only drank alcohol once since being back on Wellbutrin XL, and not in excess.  The patient denies any auditory hallucinations or visual hallucinations.  The patient does not report any reckless or impulsive behaviors.  The patient does not endorse any significant symptoms consistent with huey or psychosis at today's encounter.  The patient does not report any self-injurious behaviors.  The patient denies any suicidal or homicidal ideations, plans, or intent at today's encounter and is convincing.  The patient reports he does feel the Wellbutrin XL wears out towards the end of the day, and he would like to switch back to the Wellbutrin SR twice daily dosing.  The patient reports he would consider a higher dosage of Wellbutrin XL, but he knows he  felt the best he has ever felt in his life when he was taking Wellbutrin  mg by mouth twice daily.  The patient reports he would like to change Wellbutrin XL to the Wellbutrin SR dosing, and increase to the 100 mg by mouth twice daily dosing, if possible at today's encounter.  This APRN has also discussed the benefit of psychotherapy, and the recommendation of beginning psychotherapy, and the patient reports this is something that he will consider, and if he chooses to begin psychotherapy he will contact this APRN/office for that referral.  The patient does verbally contract for safety at today's encounter and is in verbal agreement with the safety/crisis plan. The patient reports in their own words that they will reach out to this APRN/office prior to next scheduled appointment if there is any worsening of mood, any new psychiatric symptoms, any medication side effects or concerns, any concern for safety to self or others, any suicidal or homicidal ideations plans or intent, or any concerns, or they will call 911, call or text the suicide and crisis lifeline at [phone], or go to the closest emergency department.        PHQ-9 Depression Screening  Little interest or pleasure in doing things? (P) 1-->several days   Feeling down, depressed, or hopeless? (P) 0-->not at all   Trouble falling or staying asleep, or sleeping too much? (P) 1-->several days   Feeling tired or having little energy? (P) 1-->several days   Poor appetite or overeating? (P) 0-->not at all   Feeling bad about yourself - or that you are a failure or have let yourself or your family down? (P) 0-->not at all   Trouble concentrating on things, such as reading the newspaper or watching television? (P) 1-->several days   Moving or speaking so slowly that other people could have noticed? Or the opposite - being so fidgety or restless that you have been moving around a lot more than usual? (P) 0-->not at all   Thoughts that you would be better off dead,  or of hurting yourself in some way? (P) 0-->not at all   PHQ-9 Total Score (P) 4   If you checked off any problems, how difficult have these problems made it for you to do your work, take care of things at home, or get along with other people? (P) somewhat difficult     PHQ-9 Total Score: (P) 4      BHAVIN-7  Feeling nervous, anxious or on edge: (P) Several days  Not being able to stop or control worrying: (P) Not at all  Worrying too much about different things: (P) Several days  Trouble Relaxing: (P) Several days  Being so restless that it is hard to sit still: (P) More than half the days  Feeling afraid as if something awful might happen: (P) Not at all  Becoming easily annoyed or irritable: (P) Several days  BHAVIN 7 Total Score: (P) 6  If you checked any problems, how difficult have these problems made it for you to do your work, take care of things at home, or get along with other people: (P) Somewhat difficult      All Known Prior Psychiatric Medications:  -Strattera - causes GI side effects  -Lexapro - made him feel sad and unmotivated, and took away his emotions  -Wellbutrin XL - the patient reports did not seem as effective at 150 mg dosage when compared to Wellbutrin SR  -Wellbutrin SR      The following portions of the patient's history were reviewed and updated as appropriate: allergies, current medications, past family history, past medical history, past social history, past surgical history and problem list.          Past Medical History:  Past Medical History:   Diagnosis Date    Acid reflux        Social History:  Social History     Socioeconomic History    Marital status: Single   Tobacco Use    Smoking status: Never    Smokeless tobacco: Current     Types: Snuff, Chew   Vaping Use    Vaping Use: Every day    Substances: Nicotine    Devices: Pre-filled or refillable cartridge   Substance and Sexual Activity    Alcohol use: Yes     Alcohol/week: 15.0 standard drinks of alcohol     Types: 15 Shots of  liquor per week     Comment: 10 OR MORE 2-4 TIMES A MONTH    Drug use: Never    Sexual activity: Yes     Partners: Female       Family History:  Family History   Problem Relation Age of Onset    Anxiety disorder Mother     Depression Mother     Alcohol abuse Mother     Diabetes Father     Heart disease Father     Obesity Father     Hypertension Father     Sleep apnea Father     Heart attack Maternal Grandfather     Alcohol abuse Maternal Grandfather     Diabetes Paternal Grandmother     Obesity Paternal Grandmother     Heart attack Paternal Grandmother     Heart disease Paternal Grandfather     Dementia Other         maternal side of family    Drug abuse Other         paternal side of family       Past Surgical History:  Past Surgical History:   Procedure Laterality Date    APPENDECTOMY  03/2018    WISDOM TOOTH EXTRACTION         Problem List:  Patient Active Problem List   Diagnosis    Gastroesophageal reflux disease    Chronic bilateral low back pain without sciatica    Morbidly obese    Depression with anxiety    Mixed hyperlipidemia    Borderline diabetes    Vitamin D deficiency    Attention deficit hyperactivity disorder, combined type    Mild episode of recurrent major depressive disorder       Allergy:   No Known Allergies     Current Medications:   Current Outpatient Medications   Medication Sig Dispense Refill    buPROPion SR (WELLBUTRIN SR) 100 MG 12 hr tablet Take 1 tablet by mouth 2 (Two) Times a Day. 60 tablet 0    omeprazole (priLOSEC) 40 MG capsule Take 1 capsule by mouth Daily. 30 capsule 11     No current facility-administered medications for this visit.       Review of Symptoms:    Review of Systems   Psychiatric/Behavioral:  Positive for sleep disturbance, depressed mood and stress. Negative for agitation, behavioral problems, dysphoric mood, hallucinations, self-injury, suicidal ideas and negative for hyperactivity. The patient is nervous/anxious.          Physical Exam:   There were no vitals  taken for this visit. There is no height or weight on file to calculate BMI.   Due to the remote nature of this encounter (virtual encounter), vitals were unable to be obtained.  Height stated at 68-69 inches.  Weight stated at around 240 pounds.      Physical Exam  Constitutional:       Appearance: Normal appearance.   Neurological:      Mental Status: He is alert and oriented to person, place, and time.   Psychiatric:         Attention and Perception: Attention normal.         Mood and Affect: Mood and affect normal.         Speech: Speech normal.         Behavior: Behavior normal. Behavior is cooperative.         Thought Content: Thought content normal. Thought content is not paranoid or delusional. Thought content does not include homicidal or suicidal ideation. Thought content does not include homicidal or suicidal plan.         Cognition and Memory: Cognition and memory normal.         Judgment: Judgment normal.         Mental Status Exam:   Hygiene:   good  Cooperation:  Cooperative  Eye Contact:  Good  Psychomotor Behavior:  Appropriate  Affect:  Appropriate  Mood: normal  Hopelessness: Denies  Speech:  Normal  Thought Process:  Linear  Thought Content:  Normal  Suicidal:  None  Homicidal:  None  Hallucinations:  None  Delusion:  None  Memory:  Intact  Orientation:  Person, Place, Time and Situation  Reliability:  good  Insight:  Good  Judgement:  Good  Impulse Control:  Good  Physical/Medical Issues:   no acute physical/medical issues at today's encounter          Lab Results:   No visits with results within 1 Month(s) from this visit.   Latest known visit with results is:   Office Visit on 11/13/2019   Component Date Value Ref Range Status    Endomysial IgA 11/13/2019 Negative  Negative Final    Tissue Transglutaminase IgA 11/13/2019 <2  0 - 3 U/mL Final                                  Negative        0 -  3                                Weak Positive   4 - 10                                Positive            >10   Tissue Transglutaminase (tTG) has been identified   as the endomysial antigen.  Studies have demonstr-   ated that endomysial IgA antibodies have over 99%   specificity for gluten sensitive enteropathy.    IgA 11/13/2019 155  90 - 386 mg/dL Final    Total Cholesterol 11/13/2019 264 (H)  0 - 200 mg/dL Final    Triglycerides 11/13/2019 453 (H)  0 - 150 mg/dL Final    HDL Cholesterol 11/13/2019 30 (L)  40 - 60 mg/dL Final    LDL Cholesterol  11/13/2019    Final    Unable to calculate    VLDL Cholesterol 11/13/2019    Final    Unable to calculate    LDL/HDL Ratio 11/13/2019    Final    Unable to calculate    Hemoglobin A1C 11/13/2019 5.62 (H)  4.80 - 5.60 % Final    LDL Cholesterol  11/13/2019 169 (H)  0 - 100 mg/dL Final         Assessment & Plan   Problems Addressed this Visit          Mental Health    Mild episode of recurrent major depressive disorder    Relevant Medications    buPROPion SR (WELLBUTRIN SR) 100 MG 12 hr tablet     Other Visit Diagnoses       Other mixed anxiety disorders  (Chronic)   -  Primary    Relevant Medications    buPROPion SR (WELLBUTRIN SR) 100 MG 12 hr tablet    Attention and concentration deficit  (Chronic)       Relevant Medications    buPROPion SR (WELLBUTRIN SR) 100 MG 12 hr tablet          Diagnoses         Codes Comments    Other mixed anxiety disorders    -  Primary ICD-10-CM: F41.3  ICD-9-CM: 300.09     Mild episode of recurrent major depressive disorder     ICD-10-CM: F33.0  ICD-9-CM: 296.31     Attention and concentration deficit     ICD-10-CM: R41.840  ICD-9-CM: 799.51             Visit Diagnoses:    ICD-10-CM ICD-9-CM   1. Other mixed anxiety disorders  F41.3 300.09   2. Mild episode of recurrent major depressive disorder  F33.0 296.31   3. Attention and concentration deficit  R41.840 799.51              GOALS:  Short Term Goals: Patient will be compliant with medication, and patient will have no significant medication related side effects.  Patient will be engaged in  psychotherapy as indicated.  Patient will report subjective improvement of symptoms.  Long term goals: To stabilize mood and treat/improve subjective symptoms, the patient will stay out of the hospital, the patient will be at an optimal level of functioning, and the patient will take all medications as prescribed.  The patient verbalized understanding and agreement with goals that were mutually set.      TREATMENT PLAN: Restart supportive psychotherapy efforts and take medications as indicated.  This APRN has discussed the benefit of psychotherapy, and the recommendation of beginning psychotherapy, and the patient reports this is something that he will consider, and if he chooses to begin psychotherapy he will contact this APRN/office for that referral.Medication and treatment options, both pharmacological and non-pharmacological treatment options, discussed during today's visit, including any off label use of medication. Patient acknowledged and verbally consented with current treatment plan and was educated on the importance of compliance with treatment and follow-up appointments.      -Discontinue Wellbutrin XL and begin Wellbutrin  mg by mouth twice daily for mood, and also can assist with focus/concentration.      MEDICATION ISSUES:  Discussed medication options and treatment plan of prescribed medication, any off label use of medication, as well as the risks, benefits, any black box warnings including increased suicidality, and side effects including but not limited to potential falls, dizziness, possible impaired driving, GI side effects (change in appetite, abdominal discomfort, nausea, vomiting, diarrhea, and/or constipation), dry mouth, somnolence, sedation, insomnia, activation, agitation, irritation, tremors, abnormal muscle movements or disorders, headache, sweating, possible bruising or rare bleeding, electrolyte and/or fluid abnormalities, change in blood pressure/heart rate/and or heart rhythm,  sexual dysfunction, and metabolic adversities among others. Patient and/or guardian agreeable to call the office with any worsening of symptoms or onset of side effects, or if any concerns or questions arise.  The contact information for the office is made available to the patient and/or guardian.  Patient and/or guardian agreeable to call 911 or go to the nearest ER should they begin having any SI/HI, or if any urgent concerns arise. No medication side effects or related complaints today.      VERBAL INFORMED CONSENT FOR MEDICATION:  The patient was educated that their proposed/prescribed psychotropic medication(s) has potential risks, side effects, adverse effects, and black box warnings; and these have been discussed with the patient.  The patient has been informed that their treatment and medication dosage is to be individualized, and may even be above or below the recommended range/dosage due to patient individualization and response, but medication is prescribed using a shared decision making approach, and no medication or dosage will be prescribed without the patient's verbal consent.  The reason for the use of the medication including any off label use and alternative modes of treatment other than or in addition to medication has been considered and discussed, the probable consequences of not receiving the proposed treatment have been discussed, and any treatment side effects, black box warnings, and cautions associated with treatment have been discussed with the patient.  The patient is allowed ample time to openly discuss and ask questions regarding the proposed medication(s) and treatment plan and the patient verbalizes understanding the reasons for the use of the medication, its potential risks and benefits, other alternative treatment(s), and the probable consequences that may occur if the proposed medication is not given.  The patient has been given ample time to ask questions and study the information  and find the information to be specific, accurate, and complete.  The patient gives verbal consent for the medication(s) proposed/prescribed, they verbalized understanding that they can refuse and withdraw consent at any time with the assistance of this APRN, and the patient has verbally confirmed that they are aware, and are willing, to take the prescribed medication and follow the treatment plan with the known possible risks, side effect, black box warnings, and any potential medication interactions, and the patient reports they will be worse off without this medication and treatment plan.  The patient is advised to contact this APRN/this office if any questions or concerns arise at any time (at [phone]), or call 911/go to the closest emergency department if needed or outside of office hours.      SUICIDE RISK ASSESSMENT AND SAFETY PLAN: Unalterable demographics and a history of mental health intervention indicate this patient is in a high risk category compared to the general population. At present, the patient denies active SI/HI, intentions, or plans at this time and agrees to seek immediate care should such thoughts develop. The patient verbalizes understanding of how to access emergency care if needed and agrees to do so. Consideration of suicide risk and protective factors such as history, current presentation, individual strengths and weaknesses, psychosocial and environmental stressors and variables, psychiatric illness and symptoms, medical conditions and pain, took place in this interview. Based on those considerations, the patient is determined: within individual baseline and presenting no imminent risk for suicide or homicide. Other recommendations: The patient does not meet the criteria for inpatient admission and is not a safety risk to self or others at today's visit. Inpatient treatment offers no significant advantages over outpatient treatment for this patient at today's visit.  The patient  was given ample time for questions and fully participated in treatment planning.  The patient was encouraged to call the clinic with any questions or concerns.  The patient was informed of access to emergency care. If patient were to develop any significant symptomatology, suicidal ideation, homicidal ideation, any concerns, or feel unsafe at any time they are to call the clinic and if unable to get immediate assistance should immediately call 911 or go to the nearest emergency room.  Patient contracted verbally for the following: If you are experiencing an emotional crisis or have thoughts of harming yourself or others, please go to your nearest local emergency room or call 911. Will continue to re-assess medication response and side effects frequently to establish efficacy and ensure safety. Risks, any black box warnings, side effects, off label usage, and benefits of medication and treatment discussed with patient, along with potential adverse side effects of current and/or newly prescribed medication, alternative treatment options, and OTC medications.  Patient verbalized understanding of potential risks, any off label use of medication, any black box warnings, and any side effects in their own words. The patient verbalized understanding and agreed to comply with the safety plan discussed in their own words.  Patient given the number to the office. Number also discussed of the 24- hour suicide hotline.         MEDS ORDERED DURING VISIT:  New Medications Ordered This Visit   Medications    buPROPion SR (WELLBUTRIN SR) 100 MG 12 hr tablet     Sig: Take 1 tablet by mouth 2 (Two) Times a Day.     Dispense:  60 tablet     Refill:  0     Patient requests refill as soon as medication is available       Return in about 4 weeks (around 12/19/2023), or if symptoms worsen or fail to improve, for Next scheduled follow up and Recheck.       Progress towards goal: Not at goal, progressing    Functional status: Mild impairment      Prognosis: Good with Ongoing Treatment     Treatment plan: 11/21/2023            This document has been electronically signed by ARUN Bonilla  November 21, 2023 12:02 EST    Some of the data in this electronic note has been brought forward from a previous encounter, any necessary changes have been made, it has been reviewed by this APRN, and it is accurate.    Please note that portions of this note were completed with a voice recognition program.

## 2023-11-21 NOTE — TREATMENT PLAN
Multi-Disciplinary Problems (from Behavioral Health Treatment Plan)      Active Problems       Problem: Anxiety  Start Date: 11/21/23      Problem Details: The patient self-scales this problem as a 2-3 with 10 being the worst.          Goal Priority Start Date Expected End Date End Date    Patient will develop and implement behavioral and cognitive strategies to reduce anxiety and irrational fears. -- 11/21/23 -- --    Goal Details: Progress toward goal:  Not appropriate to rate progress toward goal since this is the initial treatment plan.          Goal Intervention Frequency Start Date End Date    Help patient explore past emotional issues in relation to present anxiety. PRN 11/21/23 --    Intervention Details: Duration of treatment until until remission of symptoms.          Goal Intervention Frequency Start Date End Date    Help patient develop an awareness of their cognitive and physical responses to anxiety. PRN 11/21/23 --    Intervention Details: Duration of treatment until until remission of symptoms.                  Problem: Depression  Start Date: 11/21/23      Problem Details: The patient self-scales this problem as a 2-3 with 10 being the worst.          Goal Priority Start Date Expected End Date End Date    Patient will demonstrate the ability to initiate new constructive life skills outside of sessions on a consistent basis. -- 11/21/23 -- --    Goal Details: Progress toward goal:  Not appropriate to rate progress toward goal since this is the initial treatment plan.          Goal Intervention Frequency Start Date End Date    Assist patient in setting attainable activities of daily living goals. PRN 11/21/23 --      Goal Intervention Frequency Start Date End Date    Provide education about depression PRN 11/21/23 --    Intervention Details: Duration of treatment until until remission of symptoms.          Goal Intervention Frequency Start Date End Date    Assist patient in developing healthy coping  strategies. PRN 11/21/23 --    Intervention Details: Duration of treatment until until remission of symptoms.

## 2024-01-01 DIAGNOSIS — F41.3 OTHER MIXED ANXIETY DISORDERS: Chronic | ICD-10-CM

## 2024-01-01 DIAGNOSIS — F33.0 MILD EPISODE OF RECURRENT MAJOR DEPRESSIVE DISORDER: Chronic | ICD-10-CM

## 2024-01-01 DIAGNOSIS — R41.840 ATTENTION AND CONCENTRATION DEFICIT: Chronic | ICD-10-CM

## 2024-01-02 RX ORDER — BUPROPION HYDROCHLORIDE 100 MG/1
100 TABLET, EXTENDED RELEASE ORAL 2 TIMES DAILY
Qty: 60 TABLET | Refills: 0 | Status: SHIPPED | OUTPATIENT
Start: 2024-01-02

## 2024-02-16 DIAGNOSIS — F33.0 MILD EPISODE OF RECURRENT MAJOR DEPRESSIVE DISORDER: Chronic | ICD-10-CM

## 2024-02-16 DIAGNOSIS — F41.3 OTHER MIXED ANXIETY DISORDERS: Chronic | ICD-10-CM

## 2024-02-16 DIAGNOSIS — R41.840 ATTENTION AND CONCENTRATION DEFICIT: Chronic | ICD-10-CM

## 2024-02-19 RX ORDER — BUPROPION HYDROCHLORIDE 100 MG/1
100 TABLET, EXTENDED RELEASE ORAL 2 TIMES DAILY
Qty: 60 TABLET | Refills: 0 | Status: SHIPPED | OUTPATIENT
Start: 2024-02-19

## 2024-03-07 ENCOUNTER — TELEMEDICINE (OUTPATIENT)
Dept: PSYCHIATRY | Facility: CLINIC | Age: 29
End: 2024-03-07

## 2024-03-07 DIAGNOSIS — F41.3 OTHER MIXED ANXIETY DISORDERS: Chronic | ICD-10-CM

## 2024-03-07 DIAGNOSIS — F33.0 MILD EPISODE OF RECURRENT MAJOR DEPRESSIVE DISORDER: Primary | Chronic | ICD-10-CM

## 2024-03-07 DIAGNOSIS — R41.840 ATTENTION AND CONCENTRATION DEFICIT: Chronic | ICD-10-CM

## 2024-03-07 RX ORDER — BUPROPION HYDROCHLORIDE 100 MG/1
100 TABLET, EXTENDED RELEASE ORAL 2 TIMES DAILY
Qty: 60 TABLET | Refills: 1 | Status: SHIPPED | OUTPATIENT
Start: 2024-03-07

## 2024-03-07 NOTE — PROGRESS NOTES
This provider is located at the [facility] (through [facility]), [address], [place], [address] using a secure [employer] Video Visit through [employer]. Patient is being seen remotely via telehealth at their home address in [place], and stated they are in a secure environment for this session. The patient's condition being diagnosed/treated is appropriate for telemedicine. The provider identified herself as well as her credentials.   The patient, and/or patients guardian, consent to be seen remotely, and when consent is given they understand that the consent allows for patient identifiable information to be sent to a third party as needed.   They may refuse to be seen remotely at any time. The electronic data is encrypted and password protected, and the patient and/or guardian has been advised of the potential risks to privacy not withstanding such measures.    You have chosen to receive care through a telehealth visit.  Do you consent to use a video/audio connection for your medical care today? Yes    Patient identifiers utilized: Name and date of birth.    Patient verbally confirmed consent for today's encounter  03/07/2024 .    The patient does verbally confirm they are being seen today while physically located in the [facility].  This provider/this APRN is licensed in the [facility] where the patient is located/being seen.     Subjective   Luh Concepcion is a 28 y.o. male who presents today for follow up    Chief Complaint: Medication management follow-up: Anxiety and history of concentration difficulties    Accompanied by: The patient is interviewed alone at today's encounter    History of Present Illness:  -Since last encounter with this APRN/Office: The patient reports a lot of things have been going on in his life recently, but mostly positive things.  The patient reports him and his girlfriend are expecting a baby in August.  The patient reports he is  looking at buying the house that his mother is moving out of, and renting out his current home.  The patient denies difficulty with focus/concentration at this time.  -Mood reported as: Good in general.  The patient reports he has probably only drank alcohol once in the past 6 months, and does not feel the need or desire to do so.  -Patient rates symptoms of depression at a 2-3/10 on a 0-10 scale, with 10 being the worst.  -Patient rates symptoms of anxiety at a 2-3/10 on a 0-10 scale, with 10 being the worst.  The patient reports he has been worried about his girlfriend's health as she has had hyperemesis gravidarum.  He reports she is also wanting to do a home birth, and he is worried if something goes wrong they will not make it to the hospital in time.    -Appetite reported as: Good  -Sleep reported as: Good    -Changes in medications or new medical problems/concerns since last visit: Denies  -Reported medication compliance: The patient reports compliance with their current psychotropic medication regimen, but reports some days he does forget or he chooses not to take the second dose of Wellbutrin depending on what is going on that day or how late in the afternoon it is.  The patient reports his mood remains stable while taking his medication his wife.  -Reported medication side effects or concerns: Denies    -Auditory or visual hallucinations: Denies  -Behaviors different from patient baseline, or any reckless, impulsive, or risky behaviors: Denies  -Symptoms of huey or psychosis: Denies  -Self-injurious behavior: Denies  -SI/HI: The patient adamantly denies any suicidal or homicidal ideations, plans, or intent at the time of this encounter and is convincing.    -Using a shared decision-making approach the patient reports he does not want to change his current treatment regimen at this time, and reports he feels his current treatment regimen is effective for maintaining and treating his mood and reported  psychiatric symptoms.    -The patient does verbally contract for safety at today's encounter and is in verbal agreement with the safety/crisis plan. The patient reports in their own words that they will reach out to this APRN/office prior to next scheduled appointment if there is any worsening of mood, any new psychiatric symptoms, any medication side effects or concerns, any concern for safety to self or others, any suicidal or homicidal ideations plans or intent, or any concerns, or they will call 911, call or text the suicide and crisis lifeline at [phone], or go to the closest emergency department.      PHQ-9 Depression Screening  Little interest or pleasure in doing things? (P) 1-->several days   Feeling down, depressed, or hopeless? (P) 0-->not at all   Trouble falling or staying asleep, or sleeping too much? (P) 2-->more than half the days   Feeling tired or having little energy? (P) 1-->several days   Poor appetite or overeating? (P) 1-->several days   Feeling bad about yourself - or that you are a failure or have let yourself or your family down? (P) 0-->not at all   Trouble concentrating on things, such as reading the newspaper or watching television? (P) 1-->several days   Moving or speaking so slowly that other people could have noticed? Or the opposite - being so fidgety or restless that you have been moving around a lot more than usual? (P) 0-->not at all   Thoughts that you would be better off dead, or of hurting yourself in some way? (P) 0-->not at all   PHQ-9 Total Score (P) 6   If you checked off any problems, how difficult have these problems made it for you to do your work, take care of things at home, or get along with other people? (P) somewhat difficult     PHQ-9 Total Score: (P) 6      BHAVIN-7  Feeling nervous, anxious or on edge: (P) Not at all  Not being able to stop or control worrying: (P) Several days  Worrying too much about different things: (P) Several days  Trouble Relaxing: (P) Several  days  Being so restless that it is hard to sit still: (P) More than half the days  Feeling afraid as if something awful might happen: (P) Not at all  Becoming easily annoyed or irritable: (P) Several days  BHAVIN 7 Total Score: (P) 6  If you checked any problems, how difficult have these problems made it for you to do your work, take care of things at home, or get along with other people: (P) Not difficult at all      All Known Prior Psychiatric Medications:  -Strattera - causes GI side effects  -Lexapro - made him feel sad and unmotivated, and took away his emotions  -Wellbutrin XL - the patient reports did not seem as effective at 150 mg dosage when compared to Wellbutrin SR  -Wellbutrin SR      The following portions of the patient's history were reviewed and updated as appropriate: allergies, current medications, past family history, past medical history, past social history, past surgical history and problem list.          Past Medical History:  Past Medical History:   Diagnosis Date    Acid reflux        Social History:  Social History     Socioeconomic History    Marital status: Single   Tobacco Use    Smoking status: Never    Smokeless tobacco: Current     Types: Snuff, Chew   Vaping Use    Vaping status: Every Day    Substances: Nicotine    Devices: Pre-filled or refillable cartridge   Substance and Sexual Activity    Alcohol use: Yes     Alcohol/week: 15.0 standard drinks of alcohol     Types: 15 Shots of liquor per week     Comment: 10 OR MORE 2-4 TIMES A MONTH    Drug use: Never    Sexual activity: Yes     Partners: Female       Family History:  Family History   Problem Relation Age of Onset    Anxiety disorder Mother     Depression Mother     Alcohol abuse Mother     Diabetes Father     Heart disease Father     Obesity Father     Hypertension Father     Sleep apnea Father     Heart attack Maternal Grandfather     Alcohol abuse Maternal Grandfather     Diabetes Paternal Grandmother     Obesity Paternal  Grandmother     Heart attack Paternal Grandmother     Heart disease Paternal Grandfather     Dementia Other         maternal side of family    Drug abuse Other         paternal side of family       Past Surgical History:  Past Surgical History:   Procedure Laterality Date    APPENDECTOMY  03/2018    WISDOM TOOTH EXTRACTION         Problem List:  Patient Active Problem List   Diagnosis    Gastroesophageal reflux disease    Chronic bilateral low back pain without sciatica    Morbidly obese    Depression with anxiety    Mixed hyperlipidemia    Borderline diabetes    Vitamin D deficiency    Attention deficit hyperactivity disorder, combined type    Mild episode of recurrent major depressive disorder       Allergy:   No Known Allergies     Current Medications:   Current Outpatient Medications   Medication Sig Dispense Refill    buPROPion SR (WELLBUTRIN SR) 100 MG 12 hr tablet Take 1 tablet by mouth 2 (Two) Times a Day. 60 tablet 1    omeprazole (priLOSEC) 40 MG capsule Take 1 capsule by mouth Daily. 30 capsule 11     No current facility-administered medications for this visit.       Review of Symptoms:    Review of Systems   Psychiatric/Behavioral:  Positive for depressed mood and stress. Negative for agitation, behavioral problems, decreased concentration, dysphoric mood, hallucinations, self-injury, sleep disturbance, suicidal ideas and negative for hyperactivity. The patient is nervous/anxious.          Physical Exam:   There were no vitals taken for this visit. There is no height or weight on file to calculate BMI.   Due to the remote nature of this encounter (virtual encounter), vitals were unable to be obtained.  Height stated at 68-69 inches.  Weight stated at around 240 pounds.      Physical Exam  Constitutional:       Appearance: Normal appearance.   Neurological:      Mental Status: He is alert and oriented to person, place, and time.   Psychiatric:         Attention and Perception: Attention normal.          Mood and Affect: Mood and affect normal.         Speech: Speech normal.         Behavior: Behavior normal. Behavior is cooperative.         Thought Content: Thought content normal. Thought content is not paranoid or delusional. Thought content does not include homicidal or suicidal ideation. Thought content does not include homicidal or suicidal plan.         Cognition and Memory: Cognition and memory normal.         Judgment: Judgment normal.         Mental Status Exam:   Hygiene:   good  Cooperation:  Cooperative  Eye Contact:  Good  Psychomotor Behavior:  Appropriate  Affect:  Appropriate  Mood: normal  Hopelessness: Denies  Speech:  Normal  Thought Process:  Linear  Thought Content:  Normal  Suicidal:  None  Homicidal:  None  Hallucinations:  None  Delusion:  None  Memory:  Intact  Orientation:  Person, Place, Time and Situation  Reliability:  good  Insight:  Good  Judgement:  Good  Impulse Control:  Good  Physical/Medical Issues:   no acute physical/medical issues at today's encounter          Lab Results:   No visits with results within 1 Month(s) from this visit.   Latest known visit with results is:   Office Visit on 11/13/2019   Component Date Value Ref Range Status    Endomysial IgA 11/13/2019 Negative  Negative Final    Tissue Transglutaminase IgA 11/13/2019 <2  0 - 3 U/mL Final                                  Negative        0 -  3                                Weak Positive   4 - 10                                Positive           >10   Tissue Transglutaminase (tTG) has been identified   as the endomysial antigen.  Studies have demonstr-   ated that endomysial IgA antibodies have over 99%   specificity for gluten sensitive enteropathy.    IgA 11/13/2019 155  90 - 386 mg/dL Final    Total Cholesterol 11/13/2019 264 (H)  0 - 200 mg/dL Final    Triglycerides 11/13/2019 453 (H)  0 - 150 mg/dL Final    HDL Cholesterol 11/13/2019 30 (L)  40 - 60 mg/dL Final    LDL Cholesterol  11/13/2019    Final    Unable  to calculate    VLDL Cholesterol 11/13/2019    Final    Unable to calculate    LDL/HDL Ratio 11/13/2019    Final    Unable to calculate    Hemoglobin A1C 11/13/2019 5.62 (H)  4.80 - 5.60 % Final    LDL Cholesterol  11/13/2019 169 (H)  0 - 100 mg/dL Final         Assessment & Plan   Problems Addressed this Visit          Mental Health    Mild episode of recurrent major depressive disorder - Primary    Relevant Medications    buPROPion SR (WELLBUTRIN SR) 100 MG 12 hr tablet     Other Visit Diagnoses       Other mixed anxiety disorders  (Chronic)       Relevant Medications    buPROPion SR (WELLBUTRIN SR) 100 MG 12 hr tablet    Attention and concentration deficit  (Chronic)       Relevant Medications    buPROPion SR (WELLBUTRIN SR) 100 MG 12 hr tablet          Diagnoses         Codes Comments    Mild episode of recurrent major depressive disorder    -  Primary ICD-10-CM: F33.0  ICD-9-CM: 296.31     Other mixed anxiety disorders     ICD-10-CM: F41.3  ICD-9-CM: 300.09     Attention and concentration deficit     ICD-10-CM: R41.840  ICD-9-CM: 799.51             Visit Diagnoses:    ICD-10-CM ICD-9-CM   1. Mild episode of recurrent major depressive disorder  F33.0 296.31   2. Other mixed anxiety disorders  F41.3 300.09   3. Attention and concentration deficit  R41.840 799.51           GOALS:  Short Term Goals: Patient will be compliant with medication, and patient will have no significant medication related side effects.  Patient will be engaged in psychotherapy as indicated.  Patient will report subjective improvement of symptoms.  Long term goals: To stabilize mood and treat/improve subjective symptoms, the patient will stay out of the hospital, the patient will be at an optimal level of functioning, and the patient will take all medications as prescribed.  The patient verbalized understanding and agreement with goals that were mutually set.      TREATMENT PLAN: Begin supportive psychotherapy efforts and take medications as  indicated.  The patient has previously declined psychotherapy but reports if he decides to begin psychotherapy he will reach out to this APRN/office and let us know.  Medication and treatment options, both pharmacological and non-pharmacological treatment options, discussed during today's visit, including any off label use of medication. Patient acknowledged and verbally consented with current treatment plan and was educated on the importance of compliance with treatment and follow-up appointments.      -Continue Wellbutrin  mg by mouth twice daily for mood, and also can assist with focus/concentration.      MEDICATION ISSUES:  Discussed medication options and treatment plan of prescribed medication, any off label use of medication, as well as the risks, benefits, any black box warnings including increased suicidality, and side effects including but not limited to potential falls, dizziness, possible impaired driving, GI side effects (change in appetite, abdominal discomfort, nausea, vomiting, diarrhea, and/or constipation), dry mouth, somnolence, sedation, insomnia, activation, agitation, irritation, tremors, abnormal muscle movements or disorders, headache, sweating, possible bruising or rare bleeding, electrolyte and/or fluid abnormalities, change in blood pressure/heart rate/and or heart rhythm, sexual dysfunction, and metabolic adversities among others. Patient and/or guardian agreeable to call the office with any worsening of symptoms or onset of side effects, or if any concerns or questions arise.  The contact information for the office is made available to the patient and/or guardian.  Patient and/or guardian agreeable to call 911 or go to the nearest ER should they begin having any SI/HI, or if any urgent concerns arise. No medication side effects or related complaints today.      VERBAL INFORMED CONSENT FOR MEDICATION:  The patient was educated that their proposed/prescribed psychotropic  medication(s) has potential risks, side effects, adverse effects, and black box warnings; and these have been discussed with the patient.  The patient has been informed that their treatment and medication dosage is to be individualized, and may even be above or below the recommended range/dosage due to patient individualization and response, but medication is prescribed using a shared decision making approach, and no medication or dosage will be prescribed without the patient's verbal consent.  The reason for the use of the medication including any off label use and alternative modes of treatment other than or in addition to medication has been considered and discussed, the probable consequences of not receiving the proposed treatment have been discussed, and any treatment side effects, black box warnings, and cautions associated with treatment have been discussed with the patient.  The patient is allowed ample time to openly discuss and ask questions regarding the proposed medication(s) and treatment plan and the patient verbalizes understanding the reasons for the use of the medication, its potential risks and benefits, other alternative treatment(s), and the probable consequences that may occur if the proposed medication is not given.  The patient has been given ample time to ask questions and study the information and find the information to be specific, accurate, and complete.  The patient gives verbal consent for the medication(s) proposed/prescribed, they verbalized understanding that they can refuse and withdraw consent at any time with the assistance of this APRN, and the patient has verbally confirmed that they are aware, and are willing, to take the prescribed medication and follow the treatment plan with the known possible risks, side effect, black box warnings, and any potential medication interactions, and the patient reports they will be worse off without this medication and treatment plan.  The  patient is advised to contact this APRN/this office if any questions or concerns arise at any time (at [phone]), or call 911/go to the closest emergency department if needed or outside of office hours.      SUICIDE RISK ASSESSMENT AND SAFETY PLAN: Unalterable demographics and a history of mental health intervention indicate this patient is in a high risk category compared to the general population. At present, the patient denies active SI/HI, intentions, or plans at this time and agrees to seek immediate care should such thoughts develop. The patient verbalizes understanding of how to access emergency care if needed and agrees to do so. Consideration of suicide risk and protective factors such as history, current presentation, individual strengths and weaknesses, psychosocial and environmental stressors and variables, psychiatric illness and symptoms, medical conditions and pain, took place in this interview. Based on those considerations, the patient is determined: within individual baseline and presenting no imminent risk for suicide or homicide. Other recommendations: The patient does not meet the criteria for inpatient admission and is not a safety risk to self or others at today's visit. Inpatient treatment offers no significant advantages over outpatient treatment for this patient at today's visit.  The patient was given ample time for questions and fully participated in treatment planning.  The patient was encouraged to call the clinic with any questions or concerns.  The patient was informed of access to emergency care. If patient were to develop any significant symptomatology, suicidal ideation, homicidal ideation, any concerns, or feel unsafe at any time they are to call the clinic and if unable to get immediate assistance should immediately call 911 or go to the nearest emergency room.  Patient contracted verbally for the following: If you are experiencing an emotional crisis or have thoughts of  harming yourself or others, please go to your nearest local emergency room or call 911. Will continue to re-assess medication response and side effects frequently to establish efficacy and ensure safety. Risks, any black box warnings, side effects, off label usage, and benefits of medication and treatment discussed with patient, along with potential adverse side effects of current and/or newly prescribed medication, alternative treatment options, and OTC medications.  Patient verbalized understanding of potential risks, any off label use of medication, any black box warnings, and any side effects in their own words. The patient verbalized understanding and agreed to comply with the safety plan discussed in their own words.  Patient given the number to the office. Number also discussed of the 24- hour suicide hotline.         MEDS ORDERED DURING VISIT:  New Medications Ordered This Visit   Medications    buPROPion SR (WELLBUTRIN SR) 100 MG 12 hr tablet     Sig: Take 1 tablet by mouth 2 (Two) Times a Day.     Dispense:  60 tablet     Refill:  1       Return in about 8 weeks (around 5/2/2024), or if symptoms worsen or fail to improve, for Next scheduled follow up and Recheck.       Progress towards goal: Not at goal, progressing    Functional status: Mild impairment     Prognosis: Good with Ongoing Treatment             This document has been electronically signed by ARUN Bonilla  March 7, 2024 12:05 EST    Some of the data in this electronic note has been brought forward from a previous encounter, any necessary changes have been made, it has been reviewed by this APRN, and it is accurate.    Please note that portions of this note were completed with a voice recognition program.

## 2024-05-09 ENCOUNTER — TELEMEDICINE (OUTPATIENT)
Dept: PSYCHIATRY | Facility: CLINIC | Age: 29
End: 2024-05-09
Payer: COMMERCIAL

## 2024-05-09 DIAGNOSIS — R41.840 ATTENTION AND CONCENTRATION DEFICIT: Chronic | ICD-10-CM

## 2024-05-09 DIAGNOSIS — F41.3 OTHER MIXED ANXIETY DISORDERS: Chronic | ICD-10-CM

## 2024-05-09 DIAGNOSIS — F33.0 MILD EPISODE OF RECURRENT MAJOR DEPRESSIVE DISORDER: Primary | Chronic | ICD-10-CM

## 2024-05-09 RX ORDER — BUPROPION HYDROCHLORIDE 100 MG/1
100 TABLET, EXTENDED RELEASE ORAL 2 TIMES DAILY
Qty: 60 TABLET | Refills: 1 | Status: SHIPPED | OUTPATIENT
Start: 2024-05-09

## 2024-07-18 ENCOUNTER — TELEPHONE (OUTPATIENT)
Dept: PSYCHIATRY | Facility: CLINIC | Age: 29
End: 2024-07-18

## 2024-07-18 NOTE — TELEPHONE ENCOUNTER
Patient no-showed today's appointment; appointment was for Marianna Melchor, attempted to cont to reschedule missed appointment. Sent a my chart message to remind patient of our no show policy.

## 2025-01-02 DIAGNOSIS — R41.840 ATTENTION AND CONCENTRATION DEFICIT: Chronic | ICD-10-CM

## 2025-01-02 DIAGNOSIS — F41.3 OTHER MIXED ANXIETY DISORDERS: Chronic | ICD-10-CM

## 2025-01-02 DIAGNOSIS — F33.0 MILD EPISODE OF RECURRENT MAJOR DEPRESSIVE DISORDER: Chronic | ICD-10-CM

## 2025-01-02 RX ORDER — BUPROPION HYDROCHLORIDE 100 MG/1
100 TABLET, EXTENDED RELEASE ORAL 2 TIMES DAILY
Qty: 60 TABLET | Refills: 0 | Status: SHIPPED | OUTPATIENT
Start: 2025-01-02

## 2025-01-22 ENCOUNTER — TELEMEDICINE (OUTPATIENT)
Dept: PSYCHIATRY | Facility: CLINIC | Age: 30
End: 2025-01-22

## 2025-01-22 DIAGNOSIS — R41.840 ATTENTION AND CONCENTRATION DEFICIT: Chronic | ICD-10-CM

## 2025-01-22 DIAGNOSIS — F33.0 MILD EPISODE OF RECURRENT MAJOR DEPRESSIVE DISORDER: Primary | Chronic | ICD-10-CM

## 2025-01-22 DIAGNOSIS — F41.3 OTHER MIXED ANXIETY DISORDERS: Chronic | ICD-10-CM

## 2025-01-22 RX ORDER — BUPROPION HYDROCHLORIDE 100 MG/1
100 TABLET, EXTENDED RELEASE ORAL 2 TIMES DAILY
Qty: 60 TABLET | Refills: 2 | Status: SHIPPED | OUTPATIENT
Start: 2025-01-22
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
## 2025-03-17 DIAGNOSIS — F33.0 MILD EPISODE OF RECURRENT MAJOR DEPRESSIVE DISORDER: Chronic | ICD-10-CM

## 2025-03-17 DIAGNOSIS — R41.840 ATTENTION AND CONCENTRATION DEFICIT: Chronic | ICD-10-CM

## 2025-03-17 DIAGNOSIS — F41.3 OTHER MIXED ANXIETY DISORDERS: Chronic | ICD-10-CM

## 2025-03-17 RX ORDER — BUPROPION HYDROCHLORIDE 100 MG/1
100 TABLET, EXTENDED RELEASE ORAL 2 TIMES DAILY
Qty: 60 TABLET | Refills: 2 | Status: CANCELLED | OUTPATIENT
Start: 2025-03-17

## 2025-05-14 ENCOUNTER — TELEPHONE (OUTPATIENT)
Dept: PSYCHIATRY | Facility: CLINIC | Age: 30
End: 2025-05-14

## 2025-05-14 NOTE — TELEPHONE ENCOUNTER
Thanks for letting me know.  We can get him rescheduled when he gets his insurance figured out with billing, but if he needs refills I can send some refills in until he gets this straightened out if needed.  Thanks.

## 2025-05-14 NOTE — TELEPHONE ENCOUNTER
Patient called and stated that he did not mean to miss his appointment this morning that when he logged in his insurance was no in correctly so he had to resubmit that. He also stated that it would not allow him to join video visit with out paying $100 copay. Patient stated his copay should only be $25, but that he did pay the amount they system was requesting. Patient was educated on no show policy. Patient was also given billing departments contact information as to the payment he made. Patient was advised if the system requires payment again before allowing him to log in for him to call the office.     Marianna, I wanted to route this to you just so you would know what happened and why he missed this appointment, thanks.

## 2025-05-14 NOTE — TELEPHONE ENCOUNTER
I contacted the patient back to verify if he needs refills. Patient states at this time he does not, but was advised not to allow him self to run completely out of medication before calling and requesting refills. Also reminded him that provider is out of office on Fridays and that the office would be closed for the upcoming Holiday.

## 2025-06-17 ENCOUNTER — TELEPHONE (OUTPATIENT)
Dept: PSYCHIATRY | Facility: CLINIC | Age: 30
End: 2025-06-17

## 2025-06-17 NOTE — TELEPHONE ENCOUNTER
Called patient back to scheduled. Patient had called this morning after message had been sent to provider. Patient was rescheduled at that time. Patient was educated again on no show policy and gave verbal understanding.

## 2025-06-17 NOTE — TELEPHONE ENCOUNTER
Patient has 3 no showed appointments back to back. Please advise if you would like patient scheduled back or if you recommend for in person services.     Please advise    03-Jan-2020 04:00

## 2025-07-17 ENCOUNTER — TELEMEDICINE (OUTPATIENT)
Dept: PSYCHIATRY | Facility: CLINIC | Age: 30
End: 2025-07-17
Payer: COMMERCIAL

## 2025-07-17 DIAGNOSIS — F33.0 MILD EPISODE OF RECURRENT MAJOR DEPRESSIVE DISORDER: Primary | Chronic | ICD-10-CM

## 2025-07-17 DIAGNOSIS — R41.840 ATTENTION AND CONCENTRATION DEFICIT: Chronic | ICD-10-CM

## 2025-07-17 DIAGNOSIS — F41.3 OTHER MIXED ANXIETY DISORDERS: Chronic | ICD-10-CM

## 2025-07-17 RX ORDER — BUPROPION HYDROCHLORIDE 100 MG/1
100 TABLET, EXTENDED RELEASE ORAL 2 TIMES DAILY
Qty: 60 TABLET | Refills: 2 | Status: SHIPPED | OUTPATIENT
Start: 2025-07-17

## 2025-07-17 NOTE — PROGRESS NOTES
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          Future Appointments         Provider Department Center    10/9/2025 11:00 AM Marianna Melchor APRN [facility]                 Progress towards goal: Not at goal, progressing    Functional status: Mild impairment     Prognosis: Good with Ongoing Treatment             This document has been electronically signed by ARUN Bonilla  July 17, 2025 10:58 EDT    Some of the data in this electronic note has been brought forward from a previous encounter, any necessary changes have been made, it has been reviewed by this APRN, and it is accurate.    Please note that portions of this note were completed with a voice recognition program.